# Patient Record
Sex: MALE | Race: ASIAN | NOT HISPANIC OR LATINO
[De-identification: names, ages, dates, MRNs, and addresses within clinical notes are randomized per-mention and may not be internally consistent; named-entity substitution may affect disease eponyms.]

---

## 2023-07-25 ENCOUNTER — TRANSCRIPTION ENCOUNTER (OUTPATIENT)
Age: 58
End: 2023-07-25

## 2023-07-25 VITALS
WEIGHT: 199.96 LBS | DIASTOLIC BLOOD PRESSURE: 87 MMHG | TEMPERATURE: 98 F | HEART RATE: 57 BPM | RESPIRATION RATE: 18 BRPM | HEIGHT: 62 IN | OXYGEN SATURATION: 96 % | SYSTOLIC BLOOD PRESSURE: 135 MMHG

## 2023-07-25 LAB
ALBUMIN SERPL ELPH-MCNC: 4.1 G/DL — SIGNIFICANT CHANGE UP (ref 3.3–5)
ALP SERPL-CCNC: 56 U/L — SIGNIFICANT CHANGE UP (ref 40–120)
ALT FLD-CCNC: SIGNIFICANT CHANGE UP (ref 10–45)
ANION GAP SERPL CALC-SCNC: 14 MMOL/L — SIGNIFICANT CHANGE UP (ref 5–17)
AST SERPL-CCNC: SIGNIFICANT CHANGE UP (ref 10–40)
BASOPHILS # BLD AUTO: 0.04 K/UL — SIGNIFICANT CHANGE UP (ref 0–0.2)
BASOPHILS NFR BLD AUTO: 0.3 % — SIGNIFICANT CHANGE UP (ref 0–2)
BILIRUB SERPL-MCNC: 0.3 MG/DL — SIGNIFICANT CHANGE UP (ref 0.2–1.2)
BUN SERPL-MCNC: 15 MG/DL — SIGNIFICANT CHANGE UP (ref 7–23)
CALCIUM SERPL-MCNC: 9.7 MG/DL — SIGNIFICANT CHANGE UP (ref 8.4–10.5)
CHLORIDE SERPL-SCNC: 97 MMOL/L — SIGNIFICANT CHANGE UP (ref 96–108)
CO2 SERPL-SCNC: 24 MMOL/L — SIGNIFICANT CHANGE UP (ref 22–31)
CREAT SERPL-MCNC: 0.85 MG/DL — SIGNIFICANT CHANGE UP (ref 0.5–1.3)
EGFR: 101 ML/MIN/1.73M2 — SIGNIFICANT CHANGE UP
EOSINOPHIL # BLD AUTO: 0.01 K/UL — SIGNIFICANT CHANGE UP (ref 0–0.5)
EOSINOPHIL NFR BLD AUTO: 0.1 % — SIGNIFICANT CHANGE UP (ref 0–6)
GLUCOSE SERPL-MCNC: 370 MG/DL — HIGH (ref 70–99)
HCT VFR BLD CALC: 47.4 % — SIGNIFICANT CHANGE UP (ref 39–50)
HGB BLD-MCNC: 15.9 G/DL — SIGNIFICANT CHANGE UP (ref 13–17)
IMM GRANULOCYTES NFR BLD AUTO: 0.4 % — SIGNIFICANT CHANGE UP (ref 0–0.9)
LIDOCAIN IGE QN: 23 U/L — SIGNIFICANT CHANGE UP (ref 7–60)
LYMPHOCYTES # BLD AUTO: 14.5 % — SIGNIFICANT CHANGE UP (ref 13–44)
LYMPHOCYTES # BLD AUTO: 2 K/UL — SIGNIFICANT CHANGE UP (ref 1–3.3)
MAGNESIUM SERPL-MCNC: 1.8 MG/DL — SIGNIFICANT CHANGE UP (ref 1.6–2.6)
MCHC RBC-ENTMCNC: 28.7 PG — SIGNIFICANT CHANGE UP (ref 27–34)
MCHC RBC-ENTMCNC: 33.5 GM/DL — SIGNIFICANT CHANGE UP (ref 32–36)
MCV RBC AUTO: 85.6 FL — SIGNIFICANT CHANGE UP (ref 80–100)
MONOCYTES # BLD AUTO: 0.24 K/UL — SIGNIFICANT CHANGE UP (ref 0–0.9)
MONOCYTES NFR BLD AUTO: 1.7 % — LOW (ref 2–14)
NEUTROPHILS # BLD AUTO: 11.45 K/UL — HIGH (ref 1.8–7.4)
NEUTROPHILS NFR BLD AUTO: 83 % — HIGH (ref 43–77)
NRBC # BLD: 0 /100 WBCS — SIGNIFICANT CHANGE UP (ref 0–0)
PLATELET # BLD AUTO: 273 K/UL — SIGNIFICANT CHANGE UP (ref 150–400)
POTASSIUM SERPL-MCNC: SIGNIFICANT CHANGE UP (ref 3.5–5.3)
POTASSIUM SERPL-SCNC: SIGNIFICANT CHANGE UP (ref 3.5–5.3)
PROT SERPL-MCNC: 6.8 G/DL — SIGNIFICANT CHANGE UP (ref 6–8.3)
RBC # BLD: 5.54 M/UL — SIGNIFICANT CHANGE UP (ref 4.2–5.8)
RBC # FLD: 12.2 % — SIGNIFICANT CHANGE UP (ref 10.3–14.5)
SODIUM SERPL-SCNC: 135 MMOL/L — SIGNIFICANT CHANGE UP (ref 135–145)
WBC # BLD: 13.8 K/UL — HIGH (ref 3.8–10.5)
WBC # FLD AUTO: 13.8 K/UL — HIGH (ref 3.8–10.5)

## 2023-07-25 PROCEDURE — 71045 X-RAY EXAM CHEST 1 VIEW: CPT | Mod: 26

## 2023-07-25 PROCEDURE — 99285 EMERGENCY DEPT VISIT HI MDM: CPT

## 2023-07-25 PROCEDURE — 99282 EMERGENCY DEPT VISIT SF MDM: CPT

## 2023-07-25 PROCEDURE — 76705 ECHO EXAM OF ABDOMEN: CPT | Mod: 26

## 2023-07-25 NOTE — ED PROVIDER NOTE - PROGRESS NOTE DETAILS
karlo - wbc count 13.8, labs otherwise ok. US negative for biliary pathology.   CT prelim showing possible appy. surgery consulted.   signed out to day team pending final CT read and surg recs. pain controlled, abd exam benign at time of sign out.

## 2023-07-25 NOTE — ED PROVIDER NOTE - PHYSICAL EXAMINATION
Constitutional : Well appearing, non-toxic, no acute distress. awake, alert, oriented to person, place, time/situation.  Head : head normocephalic, atraumatic  EENMT : eyes clear bilaterally, PERRL, EOMI. airway patent. moist mucous membranes. neck supple.  Cardiac : Normal rate, regular rhythm. No murmur appreciated, no LE edema.  Resp : Breath sounds clear and equal bilaterally. Respirations even and unlabored.   Gastro : abdomen soft, nondistended. +RUQ and epigastric tenderness.  no rebound or guarding. no CVAT.  MSK :  range of motion is not limited, no muscle or joint tenderness  Vasc : Extremities warm and well perfused. 2+ radial and DP pulses. cap refill <2 seconds  Neuro : Alert and oriented, CNII-XII grossly intact, no focal deficits, no motor or sensory deficits.  Skin : Skin normal color for race, warm, dry and intact. No evidence of rash.  Psych : Alert and oriented to person, place, time/situation. normal mood and affect. no apparent risk to self or others.

## 2023-07-25 NOTE — ED ADULT NURSE NOTE - OBJECTIVE STATEMENT
58y male presents to ED c/o epigastric pain x4 hours that started while eating sweet plums, accompanied by n/v/d. Pt denies blood in stools, blood in vomit. Hx of DM2. A&Ox4.

## 2023-07-25 NOTE — ED PROVIDER NOTE - OBJECTIVE STATEMENT
58 yr old male, history of dm2, presents to the Emergency Department w abdominal pain  6 hours abd pain, right upper abdomen into epigastric region.   started after eating this afternoon   peptobismol w/o relief. constant pain. 9/10 pain.  +nausea / dry heaivng. no vomiting. They faxed a form 58 yr old male, history of dm2, presents to the Emergency Department w abdominal pain. pt w 6 hours of pain. right upper abdomen and epigastric region. started after eating this afternoon. sharp / stabbing pain. tried peptobismol w/o relief. constant pain. 9/10 pain.  +nausea / dry heaving. no vomiting. no fever, appetite changes, diarrhea, dark / bloody stools. no cp / sob. no hx similar pains.

## 2023-07-25 NOTE — ED PROVIDER NOTE - CLINICAL SUMMARY MEDICAL DECISION MAKING FREE TEXT BOX
r/o biliary pathology - cholelithiasis / cholecystitis. ddx also includes gerd, gastritis, pud, pancreatitis.   do not suspect but r/o atypical presentation of acs  plan - labs, ecg, ruq us  analgesia / antiemetics prn  reassess pt w hx of dm here w 6 hours of RUQ / epigastric pain. +nausea w/o vomiting. no fever, diarrhea.  pt well appearing, stable vitals, exam w RUQ and epigastric tenderness but benign abdomen.   r/o biliary pathology - cholelithiasis / cholecystitis.   ddx also includes gerd, gastritis, pud, pancreatitis.   do not suspect but r/o atypical presentation of acs  plan - labs, ecg, ruq us  analgesia / antiemetics prn  reassess

## 2023-07-25 NOTE — ED ADULT TRIAGE NOTE - CHIEF COMPLAINT QUOTE
Pt to ED c/o upper abd pain x1 days. Associated with dry heaving, and diarrhea. Denies blood in stool.

## 2023-07-25 NOTE — ED PROVIDER NOTE - NSFOLLOWUPINSTRUCTIONS_ED_ALL_ED_FT
Avoid greasy and spicy foods, caffeine and alcohol.   Take the medications described below as directed.   Follow up with your primary care doctor within 1 week for continued evaluation.   You should also follow up with a GASTROENTEROLOGIST if your symptoms improve. See office information listed here.     Return to this Emergency Department if your symptoms are persistent, worsening or new symptoms arise such as chest pain, shortness of breath, uncontrollable nausea/vomiting, vomiting blood, severe abdominal pain, blood in stool or black tarry stools, or any other concerns.      ALL OF THE FOLLOWING MEDICATIONS DO NOT REQUIRE A PRESCRIPTION AND CAN BE PURCHASED IN ANY PHARMACY:    1) FAMOTIDINE 20mg one tab 2 times a day (morning and night) for 1 month    2) MYLANTA as needed as directed on bottle - another GI medication to coat the stomach and treat the symptoms, does not decrease the amount of acid in the stomach but rather just coats the stomach and will help with immediate symptom relief.

## 2023-07-25 NOTE — ED PROVIDER NOTE - ATTENDING APP SHARED VISIT CONTRIBUTION OF CARE
58M dm c/o <24h postprandial ruq/epigastric abd pain. avss. no acute surgical abd. leukocytosis 13s. hyperglycemia w/o dka/hhs. ruq us w/o acute abnl. cxr w/o acute focal consolidation vs ptx vs pulm edema. persistent pain. s/o'd overnight team stable pending ct a/p -- anticipate admission.    I discussed the care of the pt directly with the ACP while the pt was in the ED. i have reviewed the ACP note and agree w/ the history, exam and plan of care other than as noted above.

## 2023-07-26 ENCOUNTER — TRANSCRIPTION ENCOUNTER (OUTPATIENT)
Age: 58
End: 2023-07-26

## 2023-07-26 ENCOUNTER — INPATIENT (INPATIENT)
Facility: HOSPITAL | Age: 58
LOS: 1 days | Discharge: HOME | DRG: 342 | End: 2023-07-28
Attending: STUDENT IN AN ORGANIZED HEALTH CARE EDUCATION/TRAINING PROGRAM | Admitting: STUDENT IN AN ORGANIZED HEALTH CARE EDUCATION/TRAINING PROGRAM
Payer: COMMERCIAL

## 2023-07-26 ENCOUNTER — RESULT REVIEW (OUTPATIENT)
Age: 58
End: 2023-07-26

## 2023-07-26 DIAGNOSIS — R10.9 UNSPECIFIED ABDOMINAL PAIN: ICD-10-CM

## 2023-07-26 LAB
A1C WITH ESTIMATED AVERAGE GLUCOSE RESULT: 11.7 % — HIGH (ref 4–5.6)
ALBUMIN SERPL ELPH-MCNC: 3.8 G/DL — SIGNIFICANT CHANGE UP (ref 3.3–5)
ALBUMIN SERPL ELPH-MCNC: 4 G/DL — SIGNIFICANT CHANGE UP (ref 3.3–5)
ALP SERPL-CCNC: 51 U/L — SIGNIFICANT CHANGE UP (ref 40–120)
ALP SERPL-CCNC: 59 U/L — SIGNIFICANT CHANGE UP (ref 40–120)
ALT FLD-CCNC: 23 U/L — SIGNIFICANT CHANGE UP (ref 10–45)
ALT FLD-CCNC: 30 U/L — SIGNIFICANT CHANGE UP (ref 10–45)
ANION GAP SERPL CALC-SCNC: 12 MMOL/L — SIGNIFICANT CHANGE UP (ref 5–17)
ANION GAP SERPL CALC-SCNC: 17 MMOL/L — SIGNIFICANT CHANGE UP (ref 5–17)
AST SERPL-CCNC: 14 U/L — SIGNIFICANT CHANGE UP (ref 10–40)
AST SERPL-CCNC: 17 U/L — SIGNIFICANT CHANGE UP (ref 10–40)
BILIRUB SERPL-MCNC: 0.3 MG/DL — SIGNIFICANT CHANGE UP (ref 0.2–1.2)
BILIRUB SERPL-MCNC: 0.4 MG/DL — SIGNIFICANT CHANGE UP (ref 0.2–1.2)
BUN SERPL-MCNC: 15 MG/DL — SIGNIFICANT CHANGE UP (ref 7–23)
BUN SERPL-MCNC: 16 MG/DL — SIGNIFICANT CHANGE UP (ref 7–23)
CALCIUM SERPL-MCNC: 8.7 MG/DL — SIGNIFICANT CHANGE UP (ref 8.4–10.5)
CALCIUM SERPL-MCNC: 9.7 MG/DL — SIGNIFICANT CHANGE UP (ref 8.4–10.5)
CHLORIDE SERPL-SCNC: 101 MMOL/L — SIGNIFICANT CHANGE UP (ref 96–108)
CHLORIDE SERPL-SCNC: 98 MMOL/L — SIGNIFICANT CHANGE UP (ref 96–108)
CO2 SERPL-SCNC: 22 MMOL/L — SIGNIFICANT CHANGE UP (ref 22–31)
CO2 SERPL-SCNC: 26 MMOL/L — SIGNIFICANT CHANGE UP (ref 22–31)
CREAT SERPL-MCNC: 0.78 MG/DL — SIGNIFICANT CHANGE UP (ref 0.5–1.3)
CREAT SERPL-MCNC: 0.87 MG/DL — SIGNIFICANT CHANGE UP (ref 0.5–1.3)
EGFR: 101 ML/MIN/1.73M2 — SIGNIFICANT CHANGE UP
EGFR: 104 ML/MIN/1.73M2 — SIGNIFICANT CHANGE UP
ESTIMATED AVERAGE GLUCOSE: 289 MG/DL — HIGH (ref 68–114)
GLUCOSE BLDC GLUCOMTR-MCNC: 207 MG/DL — HIGH (ref 70–99)
GLUCOSE BLDC GLUCOMTR-MCNC: 221 MG/DL — HIGH (ref 70–99)
GLUCOSE BLDC GLUCOMTR-MCNC: 244 MG/DL — HIGH (ref 70–99)
GLUCOSE BLDC GLUCOMTR-MCNC: 263 MG/DL — HIGH (ref 70–99)
GLUCOSE BLDC GLUCOMTR-MCNC: 285 MG/DL — HIGH (ref 70–99)
GLUCOSE BLDC GLUCOMTR-MCNC: 285 MG/DL — HIGH (ref 70–99)
GLUCOSE BLDC GLUCOMTR-MCNC: 286 MG/DL — HIGH (ref 70–99)
GLUCOSE SERPL-MCNC: 254 MG/DL — HIGH (ref 70–99)
GLUCOSE SERPL-MCNC: 358 MG/DL — HIGH (ref 70–99)
HCT VFR BLD CALC: 45.5 % — SIGNIFICANT CHANGE UP (ref 39–50)
HGB BLD-MCNC: 15.1 G/DL — SIGNIFICANT CHANGE UP (ref 13–17)
MAGNESIUM SERPL-MCNC: 1.9 MG/DL — SIGNIFICANT CHANGE UP (ref 1.6–2.6)
MCHC RBC-ENTMCNC: 28.3 PG — SIGNIFICANT CHANGE UP (ref 27–34)
MCHC RBC-ENTMCNC: 33.2 GM/DL — SIGNIFICANT CHANGE UP (ref 32–36)
MCV RBC AUTO: 85.2 FL — SIGNIFICANT CHANGE UP (ref 80–100)
NRBC # BLD: 0 /100 WBCS — SIGNIFICANT CHANGE UP (ref 0–0)
PHOSPHATE SERPL-MCNC: 3.7 MG/DL — SIGNIFICANT CHANGE UP (ref 2.5–4.5)
PLATELET # BLD AUTO: 262 K/UL — SIGNIFICANT CHANGE UP (ref 150–400)
POTASSIUM SERPL-MCNC: 4.1 MMOL/L — SIGNIFICANT CHANGE UP (ref 3.5–5.3)
POTASSIUM SERPL-MCNC: 4.7 MMOL/L — SIGNIFICANT CHANGE UP (ref 3.5–5.3)
POTASSIUM SERPL-SCNC: 4.1 MMOL/L — SIGNIFICANT CHANGE UP (ref 3.5–5.3)
POTASSIUM SERPL-SCNC: 4.7 MMOL/L — SIGNIFICANT CHANGE UP (ref 3.5–5.3)
PROT SERPL-MCNC: 6.4 G/DL — SIGNIFICANT CHANGE UP (ref 6–8.3)
PROT SERPL-MCNC: 6.9 G/DL — SIGNIFICANT CHANGE UP (ref 6–8.3)
RBC # BLD: 5.34 M/UL — SIGNIFICANT CHANGE UP (ref 4.2–5.8)
RBC # FLD: 12.8 % — SIGNIFICANT CHANGE UP (ref 10.3–14.5)
SODIUM SERPL-SCNC: 136 MMOL/L — SIGNIFICANT CHANGE UP (ref 135–145)
SODIUM SERPL-SCNC: 140 MMOL/L — SIGNIFICANT CHANGE UP (ref 135–145)
TROPONIN T, HIGH SENSITIVITY RESULT: 25 NG/L — SIGNIFICANT CHANGE UP (ref 0–51)
WBC # BLD: 17.54 K/UL — HIGH (ref 3.8–10.5)
WBC # FLD AUTO: 17.54 K/UL — HIGH (ref 3.8–10.5)

## 2023-07-26 PROCEDURE — 88304 TISSUE EXAM BY PATHOLOGIST: CPT | Mod: 26

## 2023-07-26 PROCEDURE — 99223 1ST HOSP IP/OBS HIGH 75: CPT | Mod: 57

## 2023-07-26 PROCEDURE — 99221 1ST HOSP IP/OBS SF/LOW 40: CPT

## 2023-07-26 PROCEDURE — 44970 LAPAROSCOPY APPENDECTOMY: CPT

## 2023-07-26 PROCEDURE — 74177 CT ABD & PELVIS W/CONTRAST: CPT | Mod: 26,MA

## 2023-07-26 PROCEDURE — 99222 1ST HOSP IP/OBS MODERATE 55: CPT | Mod: GC

## 2023-07-26 DEVICE — STAPLER COVIDIEN TRI-STAPLE 60MM PURPLE RELOAD: Type: IMPLANTABLE DEVICE | Status: FUNCTIONAL

## 2023-07-26 RX ORDER — GLUCAGON INJECTION, SOLUTION 0.5 MG/.1ML
1 INJECTION, SOLUTION SUBCUTANEOUS ONCE
Refills: 0 | Status: DISCONTINUED | OUTPATIENT
Start: 2023-07-26 | End: 2023-07-28

## 2023-07-26 RX ORDER — METRONIDAZOLE 500 MG
500 TABLET ORAL ONCE
Refills: 0 | Status: COMPLETED | OUTPATIENT
Start: 2023-07-26 | End: 2023-07-26

## 2023-07-26 RX ORDER — OXYCODONE HYDROCHLORIDE 5 MG/1
5 TABLET ORAL EVERY 6 HOURS
Refills: 0 | Status: DISCONTINUED | OUTPATIENT
Start: 2023-07-26 | End: 2023-07-28

## 2023-07-26 RX ORDER — METRONIDAZOLE 500 MG
500 TABLET ORAL EVERY 8 HOURS
Refills: 0 | Status: DISCONTINUED | OUTPATIENT
Start: 2023-07-26 | End: 2023-07-26

## 2023-07-26 RX ORDER — IOHEXOL 300 MG/ML
30 INJECTION, SOLUTION INTRAVENOUS ONCE
Refills: 0 | Status: COMPLETED | OUTPATIENT
Start: 2023-07-26 | End: 2023-07-26

## 2023-07-26 RX ORDER — MORPHINE SULFATE 50 MG/1
4 CAPSULE, EXTENDED RELEASE ORAL ONCE
Refills: 0 | Status: DISCONTINUED | OUTPATIENT
Start: 2023-07-26 | End: 2023-07-26

## 2023-07-26 RX ORDER — LOSARTAN/HYDROCHLOROTHIAZIDE 100MG-25MG
1 TABLET ORAL
Refills: 0 | DISCHARGE

## 2023-07-26 RX ORDER — BUDESONIDE AND FORMOTEROL FUMARATE DIHYDRATE 160; 4.5 UG/1; UG/1
2 AEROSOL RESPIRATORY (INHALATION)
Refills: 0 | Status: DISCONTINUED | OUTPATIENT
Start: 2023-07-26 | End: 2023-07-28

## 2023-07-26 RX ORDER — HEPARIN SODIUM 5000 [USP'U]/ML
7500 INJECTION INTRAVENOUS; SUBCUTANEOUS EVERY 8 HOURS
Refills: 0 | Status: DISCONTINUED | OUTPATIENT
Start: 2023-07-27 | End: 2023-07-28

## 2023-07-26 RX ORDER — ONDANSETRON 8 MG/1
4 TABLET, FILM COATED ORAL ONCE
Refills: 0 | Status: COMPLETED | OUTPATIENT
Start: 2023-07-26 | End: 2023-07-26

## 2023-07-26 RX ORDER — OXYCODONE HYDROCHLORIDE 5 MG/1
10 TABLET ORAL EVERY 6 HOURS
Refills: 0 | Status: DISCONTINUED | OUTPATIENT
Start: 2023-07-26 | End: 2023-07-28

## 2023-07-26 RX ORDER — SODIUM CHLORIDE 9 MG/ML
1000 INJECTION, SOLUTION INTRAVENOUS
Refills: 0 | Status: DISCONTINUED | OUTPATIENT
Start: 2023-07-26 | End: 2023-07-28

## 2023-07-26 RX ORDER — ACETAMINOPHEN 500 MG
1000 TABLET ORAL ONCE
Refills: 0 | Status: COMPLETED | OUTPATIENT
Start: 2023-07-26 | End: 2023-07-26

## 2023-07-26 RX ORDER — SODIUM CHLORIDE 9 MG/ML
1000 INJECTION INTRAMUSCULAR; INTRAVENOUS; SUBCUTANEOUS ONCE
Refills: 0 | Status: COMPLETED | OUTPATIENT
Start: 2023-07-26 | End: 2023-07-26

## 2023-07-26 RX ORDER — ATORVASTATIN CALCIUM 80 MG/1
1 TABLET, FILM COATED ORAL
Refills: 0 | DISCHARGE

## 2023-07-26 RX ORDER — TIOTROPIUM BROMIDE 18 UG/1
2 CAPSULE ORAL; RESPIRATORY (INHALATION) DAILY
Refills: 0 | Status: DISCONTINUED | OUTPATIENT
Start: 2023-07-26 | End: 2023-07-28

## 2023-07-26 RX ORDER — FLUTICASONE FUROATE, UMECLIDINIUM BROMIDE AND VILANTEROL TRIFENATATE 200; 62.5; 25 UG/1; UG/1; UG/1
1 POWDER RESPIRATORY (INHALATION)
Refills: 0 | DISCHARGE

## 2023-07-26 RX ORDER — CEFTRIAXONE 500 MG/1
INJECTION, POWDER, FOR SOLUTION INTRAMUSCULAR; INTRAVENOUS
Refills: 0 | Status: COMPLETED | OUTPATIENT
Start: 2023-07-26 | End: 2023-08-02

## 2023-07-26 RX ORDER — MONTELUKAST 4 MG/1
1 TABLET, CHEWABLE ORAL
Refills: 0 | DISCHARGE

## 2023-07-26 RX ORDER — DEXTROSE 50 % IN WATER 50 %
12.5 SYRINGE (ML) INTRAVENOUS ONCE
Refills: 0 | Status: DISCONTINUED | OUTPATIENT
Start: 2023-07-26 | End: 2023-07-28

## 2023-07-26 RX ORDER — METFORMIN HYDROCHLORIDE 850 MG/1
1 TABLET ORAL
Refills: 0 | DISCHARGE

## 2023-07-26 RX ORDER — INSULIN LISPRO 100/ML
VIAL (ML) SUBCUTANEOUS
Refills: 0 | Status: DISCONTINUED | OUTPATIENT
Start: 2023-07-26 | End: 2023-07-26

## 2023-07-26 RX ORDER — CEFTRIAXONE 500 MG/1
1000 INJECTION, POWDER, FOR SOLUTION INTRAMUSCULAR; INTRAVENOUS ONCE
Refills: 0 | Status: COMPLETED | OUTPATIENT
Start: 2023-07-26 | End: 2023-07-26

## 2023-07-26 RX ORDER — ATORVASTATIN CALCIUM 80 MG/1
20 TABLET, FILM COATED ORAL AT BEDTIME
Refills: 0 | Status: DISCONTINUED | OUTPATIENT
Start: 2023-07-26 | End: 2023-07-28

## 2023-07-26 RX ORDER — HYDROMORPHONE HYDROCHLORIDE 2 MG/ML
0.5 INJECTION INTRAMUSCULAR; INTRAVENOUS; SUBCUTANEOUS
Refills: 0 | Status: DISCONTINUED | OUTPATIENT
Start: 2023-07-26 | End: 2023-07-28

## 2023-07-26 RX ORDER — DEXTROSE 50 % IN WATER 50 %
15 SYRINGE (ML) INTRAVENOUS ONCE
Refills: 0 | Status: DISCONTINUED | OUTPATIENT
Start: 2023-07-26 | End: 2023-07-28

## 2023-07-26 RX ORDER — MONTELUKAST 4 MG/1
10 TABLET, CHEWABLE ORAL DAILY
Refills: 0 | Status: DISCONTINUED | OUTPATIENT
Start: 2023-07-26 | End: 2023-07-28

## 2023-07-26 RX ORDER — DEXTROSE 50 % IN WATER 50 %
25 SYRINGE (ML) INTRAVENOUS ONCE
Refills: 0 | Status: DISCONTINUED | OUTPATIENT
Start: 2023-07-26 | End: 2023-07-28

## 2023-07-26 RX ORDER — INSULIN LISPRO 100/ML
6 VIAL (ML) SUBCUTANEOUS ONCE
Refills: 0 | Status: COMPLETED | OUTPATIENT
Start: 2023-07-26 | End: 2023-07-26

## 2023-07-26 RX ORDER — HEPARIN SODIUM 5000 [USP'U]/ML
5000 INJECTION INTRAVENOUS; SUBCUTANEOUS EVERY 8 HOURS
Refills: 0 | Status: DISCONTINUED | OUTPATIENT
Start: 2023-07-26 | End: 2023-07-26

## 2023-07-26 RX ORDER — METRONIDAZOLE 500 MG
TABLET ORAL
Refills: 0 | Status: DISCONTINUED | OUTPATIENT
Start: 2023-07-26 | End: 2023-07-26

## 2023-07-26 RX ORDER — INSULIN LISPRO 100/ML
VIAL (ML) SUBCUTANEOUS
Refills: 0 | Status: DISCONTINUED | OUTPATIENT
Start: 2023-07-26 | End: 2023-07-28

## 2023-07-26 RX ORDER — SODIUM CHLORIDE 9 MG/ML
1000 INJECTION, SOLUTION INTRAVENOUS
Refills: 0 | Status: DISCONTINUED | OUTPATIENT
Start: 2023-07-26 | End: 2023-07-27

## 2023-07-26 RX ORDER — INSULIN GLARGINE 100 [IU]/ML
16 INJECTION, SOLUTION SUBCUTANEOUS AT BEDTIME
Refills: 0 | Status: DISCONTINUED | OUTPATIENT
Start: 2023-07-26 | End: 2023-07-27

## 2023-07-26 RX ORDER — FAMOTIDINE 10 MG/ML
20 INJECTION INTRAVENOUS ONCE
Refills: 0 | Status: COMPLETED | OUTPATIENT
Start: 2023-07-26 | End: 2023-07-26

## 2023-07-26 RX ORDER — PIOGLITAZONE HYDROCHLORIDE 15 MG/1
1 TABLET ORAL
Refills: 0 | DISCHARGE

## 2023-07-26 RX ADMIN — MORPHINE SULFATE 4 MILLIGRAM(S): 50 CAPSULE, EXTENDED RELEASE ORAL at 02:58

## 2023-07-26 RX ADMIN — INSULIN GLARGINE 16 UNIT(S): 100 INJECTION, SOLUTION SUBCUTANEOUS at 22:06

## 2023-07-26 RX ADMIN — SODIUM CHLORIDE 120 MILLILITER(S): 9 INJECTION, SOLUTION INTRAVENOUS at 09:52

## 2023-07-26 RX ADMIN — Medication 400 MILLIGRAM(S): at 10:08

## 2023-07-26 RX ADMIN — Medication 400 MILLIGRAM(S): at 15:57

## 2023-07-26 RX ADMIN — Medication 400 MILLIGRAM(S): at 22:05

## 2023-07-26 RX ADMIN — SODIUM CHLORIDE 1000 MILLILITER(S): 9 INJECTION INTRAMUSCULAR; INTRAVENOUS; SUBCUTANEOUS at 06:36

## 2023-07-26 RX ADMIN — Medication 100 MILLIGRAM(S): at 09:52

## 2023-07-26 RX ADMIN — ONDANSETRON 4 MILLIGRAM(S): 8 TABLET, FILM COATED ORAL at 03:13

## 2023-07-26 RX ADMIN — Medication 100 MILLIGRAM(S): at 16:50

## 2023-07-26 RX ADMIN — Medication 6: at 20:57

## 2023-07-26 RX ADMIN — Medication 4: at 12:53

## 2023-07-26 RX ADMIN — CEFTRIAXONE 100 MILLIGRAM(S): 500 INJECTION, POWDER, FOR SOLUTION INTRAMUSCULAR; INTRAVENOUS at 09:18

## 2023-07-26 RX ADMIN — Medication 1000 MILLIGRAM(S): at 16:27

## 2023-07-26 RX ADMIN — BUDESONIDE AND FORMOTEROL FUMARATE DIHYDRATE 2 PUFF(S): 160; 4.5 AEROSOL RESPIRATORY (INHALATION) at 22:06

## 2023-07-26 RX ADMIN — IOHEXOL 30 MILLILITER(S): 300 INJECTION, SOLUTION INTRAVENOUS at 02:58

## 2023-07-26 RX ADMIN — HEPARIN SODIUM 5000 UNIT(S): 5000 INJECTION INTRAVENOUS; SUBCUTANEOUS at 15:55

## 2023-07-26 RX ADMIN — Medication 1000 MILLIGRAM(S): at 10:45

## 2023-07-26 RX ADMIN — SODIUM CHLORIDE 120 MILLILITER(S): 9 INJECTION, SOLUTION INTRAVENOUS at 22:05

## 2023-07-26 RX ADMIN — ATORVASTATIN CALCIUM 20 MILLIGRAM(S): 80 TABLET, FILM COATED ORAL at 22:05

## 2023-07-26 RX ADMIN — FAMOTIDINE 20 MILLIGRAM(S): 10 INJECTION INTRAVENOUS at 02:58

## 2023-07-26 RX ADMIN — MONTELUKAST 10 MILLIGRAM(S): 4 TABLET, CHEWABLE ORAL at 12:52

## 2023-07-26 RX ADMIN — Medication 6 UNIT(S): at 22:56

## 2023-07-26 NOTE — H&P ADULT - ATTENDING COMMENTS
Patient is a 58 year old gentleman with history of HTN, DM, Hyperlipidemia who presents with clinical, laboratory, radiographic findings concerning for acute appendicitis. Patient reporting right sided abdominal pain with nausea and emesis as well as diarrhea. At time of evaluation afebrile, hemodynamically stable, right abdomen tender to palpation, soft, mild distension, obese. CT imaging demonstrates a dilated and enlarged fluid filled appendix with inflammatory changes and periappendiceal stranding consistent with acute uncomplicated appendicitis. Labs significant for leukocytosis as well as hyperglycemia and A1C 11. Admission, bowel rest, IV fluids, IV antibiotics, OR for laparoscopic appendectomy.

## 2023-07-26 NOTE — CONSULT NOTE ADULT - SUBJECTIVE AND OBJECTIVE BOX
HPI:  58 year old male with pmhx of Dm2, htn, and hld w. no SHx presents to ED with 1 day history of new onset sharp abdominal pain. Patient states pain occurred after eating a plum yesterday afternoon, started near epigastrium and radiated to RUQ. Symptoms associated with +n/+v x3/ +f/ +liquidly bm x3. Pain aggravated with food, and resolved with pain medication. Denies previous episodes, recent travel, sick contacts, fever, weakness, light headiness sob, chest pain.    In the ED; wbc 13.8/ hgb 15.9/ hct 47.4. CMP  bilirubin .3/ ALP 59/ AST 17/ ALT 30. RUQ US no CBD dilation, only Hepatic steatosis and borderline hepatomegaly. CT A/p w. contrast IV/ PO   1. No abnormal radiologic findings to explain upper abdominal pain.  2. Dilated fluid-filled appendix with mild inflammatory change concerning for possible acute appendicitis. Correlate clinically i.e assess for right lower quadrant rebound tenderness..   (26 Jul 2023 08:24)    PMH: HTN, DM II, HLD, ? COPD  PSH: denies:  SHx: former smoker quit 7 years ago, occasional alcohol, works as a   Family history: father with MI age 58,  Allergies: denies      Patient is a 58y old  Male who presents with a chief complaint of Abdominal Pain (26 Jul 2023 08:24)    SUBJECTIVE:  Patient was seen and examined at bedside. Reports persistent RLQ abdominal pain, no N/V, last episode of vomiting at 3 am this morning, no diarrhea, he is unsure if passing flatus. Denies SOB, no chest pain, no orthopnea, reports quitting smoking 7 years ago. Denies SOB, no cough. He is unable to remember all his medications.     Review of systems: Rest of 12 point Review of systems negative unless otherwise documented elsewhere in note.     Diet, NPO:   Except Medications  With Ice Chips/Sips of Water (07-26-23 @ 10:38) [Active]      MEDICATIONS:  MEDICATIONS  (STANDING):  acetaminophen   IVPB .. 1000 milliGRAM(s) IV Intermittent once  acetaminophen   IVPB .. 1000 milliGRAM(s) IV Intermittent once  atorvastatin 20 milliGRAM(s) Oral at bedtime  budesonide  80 MICROgram(s)/formoterol 4.5 MICROgram(s) Inhaler 2 Puff(s) Inhalation two times a day  dextrose 5%. 1000 milliLiter(s) (50 mL/Hr) IV Continuous <Continuous>  dextrose 5%. 1000 milliLiter(s) (100 mL/Hr) IV Continuous <Continuous>  dextrose 50% Injectable 12.5 Gram(s) IV Push once  dextrose 50% Injectable 25 Gram(s) IV Push once  dextrose 50% Injectable 25 Gram(s) IV Push once  glucagon  Injectable 1 milliGRAM(s) IntraMuscular once  heparin   Injectable 5000 Unit(s) SubCutaneous every 8 hours  insulin lispro (ADMELOG) corrective regimen sliding scale   SubCutaneous three times a day before meals  lactated ringers. 1000 milliLiter(s) (120 mL/Hr) IV Continuous <Continuous>  metroNIDAZOLE  IVPB 500 milliGRAM(s) IV Intermittent every 8 hours  metroNIDAZOLE  IVPB      montelukast 10 milliGRAM(s) Oral daily  tiotropium 2.5 MICROgram(s) Inhaler 2 Puff(s) Inhalation daily    MEDICATIONS  (PRN):  dextrose Oral Gel 15 Gram(s) Oral once PRN Blood Glucose LESS THAN 70 milliGRAM(s)/deciliter      Allergies    No Known Allergies    Intolerances        OBJECTIVE:  Vital Signs Last 24 Hrs  T(C): 36.7 (26 Jul 2023 14:14), Max: 36.8 (26 Jul 2023 09:21)  T(F): 98 (26 Jul 2023 14:14), Max: 98.3 (26 Jul 2023 09:21)  HR: 70 (26 Jul 2023 14:14) (57 - 96)  BP: 157/96 (26 Jul 2023 14:14) (135/87 - 159/99)  BP(mean): --  RR: 18 (26 Jul 2023 14:14) (16 - 18)  SpO2: 96% (26 Jul 2023 14:14) (95% - 98%)    Parameters below as of 26 Jul 2023 14:14  Patient On (Oxygen Delivery Method): room air      I&O's Summary      PHYSICAL EXAM:  General: AOX3, NAD, lying in bed, speaking in full sentences, no labored breathing on RA  HEENT: AT/NC, no facial asymmetry  Lungs: good air entry, no crackles, no wheezes  Heart: RRR  Abdomen: obese, soft, RLQ tenderness, no rebound, + BS  Extremities:  warm, no edema, right knee scaly rash( chronic for last year), no tenderness, no focal deficit     LABS:                        15.1   17.54 )-----------( 262      ( 26 Jul 2023 12:55 )             45.5     07-26    136  |  98  |  16  ----------------------------<  254<H>  4.1   |  26  |  0.87    Ca    8.7      26 Jul 2023 12:55  Phos  3.7     07-26  Mg     1.9     07-26    TPro  6.4  /  Alb  3.8  /  TBili  0.4  /  DBili  x   /  AST  14  /  ALT  23  /  AlkPhos  51  07-26    LIVER FUNCTIONS - ( 26 Jul 2023 12:55 )  Alb: 3.8 g/dL / Pro: 6.4 g/dL / ALK PHOS: 51 U/L / ALT: 23 U/L / AST: 14 U/L / GGT: x             CAPILLARY BLOOD GLUCOSE      POCT Blood Glucose.: 244 mg/dL (26 Jul 2023 12:46)    Urinalysis Basic - ( 26 Jul 2023 12:55 )    Color: x / Appearance: x / SG: x / pH: x  Gluc: 254 mg/dL / Ketone: x  / Bili: x / Urobili: x   Blood: x / Protein: x / Nitrite: x   Leuk Esterase: x / RBC: x / WBC x   Sq Epi: x / Non Sq Epi: x / Bacteria: x        MICRODATA:      RADIOLOGY/OTHER STUDIES:  CT abdomen pelvis, CXR, RUQ US, EKG reviewed

## 2023-07-26 NOTE — CONSULT NOTE ADULT - ATTENDING COMMENTS
Pt seen on rounds this afternoon.  58-yo man with HTN, COPD and type 2 DM who presented after experiencing acute onset of periumbilical pain with vomiting while at work today.  The pain was non-radiating, and may have started to move somewhat to the right side after a few hours.  CT scan was suggestive of acute appendicitis and he is scheduled to go to the OR later today.  Has a 3-year history of type 2 DM, currently treated with a combination of metformin and glimepiride.  Glucose was 370 mg%, A1c 11.7% on presentation today.  He does not monitor fingersticks at home, and is unclear when he last saw his PCP.  Diet is detailed by Dr. Piedra above, and is obviously a major contributor to his hyperglycemia--sugar in his coffee, fruit drinks and regular soda, excessive portions of rice at his meal, cheese biscuits, etc as snacks.  Family history is strongly positive for DM.  Exam is most notable for marked central obesity with a distinctly protuberant abdomen.  There is periumbilical and RUQ/RLQ guarding.  --Will start on basal insulin with 16 units of Lantus tonight  --Moderate dose sliding scale coverage  --Cannot start on premeal standing insulin until diet resumed post-op  --Discussed diet with pt in detail, though he was only partially aware of some of the changes he needed to make--admits that cutting out the fruit and fruit drinks will be a problem  --Also discussed the risk of his fatty liver in terms of progression to cirrhosis, risk of HCC

## 2023-07-26 NOTE — H&P ADULT - ASSESSMENT
58 year old male with pmhx of Dm2, htn, and hld w. no SHx presents to ED with new onset sharp consistent abdominal pain starting at the epigastrium and radiating to the RUQ.. Patient states pain occured after eating a plum yesterday afternoon, Symptoms associated with +n/+v x3/ +f/ +liquidly bm x3. Pain aggrevated with food, and resolved with pain medication. In the ED labs significant for wbc 13.8/ hgb 15.9/ hct 47.4. Bilirubin .3/ ALP 59/ AST 17/ ALT 30. RUQ US no CBD dilation, only Hepatic steatosis and borderline hepatomegaly. CT A/p w. contrast IV/ PO Dilated fluid-filled appendix with mild inflammatory change concerning for possible acute appendicitis. Correlate clinically i.e assess for right lower quadrant rebound tenderness. Differentials include acute appendicitis vs. Viral gastroenteritis.     Plan;   Admit to regional for 23hr obs  NPO/ IVF  Pain/ Nausea control  Home meds  mISS/ SCD/ IS/ SQH       58 year old male with pmhx of Dm2, htn, and hld w. no SHx presents to ED with new onset sharp consistent abdominal pain starting at the epigastrium and radiating to the RUQ.. Patient states pain occured after eating a plum yesterday afternoon, Symptoms associated with +n/+v x3/ +f/ +liquidly bm x3. Pain aggrevated with food, and resolved with pain medication. In the ED labs significant for wbc 13.8/ hgb 15.9/ hct 47.4. Bilirubin .3/ ALP 59/ AST 17/ ALT 30. RUQ US no CBD dilation, only Hepatic steatosis and borderline hepatomegaly. CT A/p w. contrast IV/ PO Dilated fluid-filled appendix with mild inflammatory change concerning for possible acute appendicitis. Correlate clinically i.e assess for right lower quadrant rebound tenderness. Differentials include acute appendicitis vs. Viral gastroenteritis.     Plan;   Admit to regional for 23hr obs  NPO/ IVF  Pain/ Nausea control  Home meds  FABIANO  mISS/ SCD/ IS/ SQH       58 year old male with pmhx of Dm2, htn, and hld w. no SHx presents to ED with new onset sharp consistent abdominal pain starting at the epigastrium and radiating to the RUQ.. Patient states pain occured after eating a plum yesterday afternoon, Symptoms associated with +n/+v x3/ +f/ +liquidly bm x3. Pain aggrevated with food, and resolved with pain medication. In the ED labs significant for wbc 13.8/ hgb 15.9/ hct 47.4. Bilirubin .3/ ALP 59/ AST 17/ ALT 30. RUQ US no CBD dilation, only Hepatic steatosis and borderline hepatomegaly. CT A/p w. contrast IV/ PO Dilated fluid-filled appendix with mild inflammatory change concerning for possible acute appendicitis. Correlate clinically i.e assess for right lower quadrant rebound tenderness. Differentials include acute appendicitis vs. Viral gastroenteritis.     Plan;   Admit to regional for 23hr obs  NPO/ IVF  CTX / Flagyl  Pain/ Nausea control  Home meds  FABIANO  mISS/ SCD/ IS/ SQH

## 2023-07-26 NOTE — H&P ADULT - NSHPLABSRESULTS_GEN_ALL_CORE
ACC: [id number] EXAM:  CT ABDOMEN AND PELVIS OC IC       PROCEDURE DATE:  07/26/2023   INTERPRETATION:  CLINICAL INFORMATION: Upper abdominal pain.  COMPARISON: None.    CONTRAST/COMPLICATIONS:  IV Contrast: Isovue 370  95 cc administered   5 cc discarded  Oral Contrast: Omnipaque 300  Complications: None reported at time of study completion    PROCEDURE:  CT of the Abdomen and Pelvis was performed.  Sagittal and coronal reformats were performed.    FINDINGS:  LOWER CHEST: Mild cardiomegaly with dilated appearance of left ventricle.   Mosaic lung attenuation of the lower lobes.    LIVER: Approaches upper limit of normal in size, measuring 18.7 cm   craniocaudal dimension. A subcentimeter hypodensity in the right hepatic   lobe is too small to characterize.  BILE DUCTS: Normal caliber.  GALLBLADDER: Within normal limits.  SPLEEN: Within normal limits.  PANCREAS: Within normal limits.  ADRENALS: Benign fatty 2.1 cm nodular lesion of right adrenal   gland-consistent with myelolipoma..  KIDNEYS/URETERS: Within normal limits.    BLADDER: Within normal limits.  REPRODUCTIVE ORGANS: Normal size prostate gland with central coarse   calcifications noted, nonspecific.    BOWEL: Enteric contrast reaches small bowel loops in the lower abdomen.   No contrast filling of the colon. No bowel obstruction. Dilated   fluid-filled retrocecal appendix measuring up to 1.3 cm diameter. Likely   mild adjacent fat stranding.  PERITONEUM: No ascites.  VESSELS: Mild atherosclerotic changes.  RETROPERITONEUM/LYMPH NODES: No lymphadenopathy.  ABDOMINAL WALL: Small fat-containing umbilical and bilateral inguinal   hernias.  BONES: Lumbarization of S1. Degenerative disc disease L5/S1.   Enthesopathic changes bilateral anterior superior iliac spines.    IMPRESSION:  1. No abnormal radiologic findings to explain upper abdominal pain.    2. Dilated fluid-filled appendix with mild inflammatory change concerning   for possible acute appendicitis. Correlate clinically i.e assess for   right lower quadrant rebound tenderness..    ACC: [id number] EXAM:  US ABDOMEN RT UPR QUADRANT   ORDERED BY: DEL RUBIO     PROCEDURE DATE:  07/25/2023        FINDINGS:    Liver: Borderline enlarged, measuring 17.8 cm craniocaudal dimension.   Increased parenchymal echogenicity. No focal lesion identified.   Hepatopetal flow noted in main portal vein.  Bile ducts: Normal caliber. Common bile duct measures 4 mm.  Gallbladder: Within normal limits.  Pancreas: Visualized portions are within normal limits.  Right kidney: 11.5 cm. No hydronephrosis.  Ascites: None.  IVC: Visualized portions are within normal limits.    IMPRESSION:    1.No acute pathologic findings identified.    2. Hepatic steatosis and borderline hepatomegaly.

## 2023-07-26 NOTE — CONSULT NOTE ADULT - SUBJECTIVE AND OBJECTIVE BOX
HISTORY OF PRESENT ILLNESS  JOI THOMAS is a 58y Male with a past medical history of HTN, DM type 2, COPD presented with 1 day history of sudden onset abdominal pain while he was at work.  He describes the pain as sharp, localizes in the periumbilcal region, no radiation, accompanied by vomiting.  He denies hematemesis, hematochezia, melena.      On admission, Pt was afebrile and hemodynamically stable.  Labs reveal WBC 13.8, Cr 0.85, Na 135, Glucose 370, AST 14, ALT 23, ALP 51, lipase 23, a1c 11.7.    Abd ultrasound showed Hepatic steatosis and borderline hepatomegaly.  CT A/P showed Dilated fluid-filled appendix with mild inflammatory change concerning  for possible acute appendicitis.  Pt had received 1x ceftriaxone and 1xflagyl and the plan is to take patient to surgery.    Endocrinology has been consulted for Diabetes Management.    DIABETES HISTORY  - Age at diagnosis: 3 years ago  - Current Therapy: metformin 1g bid, glimipiride 4mg  - History of other regimens: none   - History of hypoglycemia: NA  - History of DKA/HHS:  NA  - Diabetic Complications:   - Home FSG: checked last time last year.  only checked FBG - usually around 120s  - Diet:          > Breakfast:  coffee with 2 spoons of brown sugar, potatoes, eggs        > Lunch: usually skips lunch        > Dinner: 3 scoops of rice, fried fish or fried meat, soup, strawberry and blueberry juice        > Snacks/drinks: Gingerale 1 can/day  - Diabetes managed outpatient by: PCP, pt states he does not remember what his last a1c was last blood work and saw the doctor 1 year ago    FAMILY HISTORY  - Diabetes: 3 family members, 1 brother  - Thyroid: denies  - Autoimmune: denies    SOCIAL HISTORY  - Work:   - Alcohol: socially  - Smoking: quit 7 years ago    ALLERGIES  No Known Allergies      CURRENT MEDICATIONS  acetaminophen   IVPB .. 1000 milliGRAM(s) IV Intermittent once  atorvastatin 20 milliGRAM(s) Oral at bedtime  budesonide  80 MICROgram(s)/formoterol 4.5 MICROgram(s) Inhaler 2 Puff(s) Inhalation two times a day  dextrose 5%. 1000 milliLiter(s) IV Continuous <Continuous>  dextrose 5%. 1000 milliLiter(s) IV Continuous <Continuous>  dextrose 50% Injectable 12.5 Gram(s) IV Push once  dextrose 50% Injectable 25 Gram(s) IV Push once  dextrose 50% Injectable 25 Gram(s) IV Push once  dextrose Oral Gel 15 Gram(s) Oral once PRN  glucagon  Injectable 1 milliGRAM(s) IntraMuscular once  heparin   Injectable 5000 Unit(s) SubCutaneous every 8 hours  insulin lispro (ADMELOG) corrective regimen sliding scale   SubCutaneous three times a day before meals  lactated ringers. 1000 milliLiter(s) IV Continuous <Continuous>  metroNIDAZOLE  IVPB 500 milliGRAM(s) IV Intermittent every 8 hours  metroNIDAZOLE  IVPB      montelukast 10 milliGRAM(s) Oral daily  tiotropium 2.5 MICROgram(s) Inhaler 2 Puff(s) Inhalation daily      REVIEW OF SYSTEMS  Constitutional:  Negative fever, chills or loss of appetite.  Eyes:  Negative blurry vision or double vision.  Cardiovascular:  Negative for chest pain or palpitations.  Respiratory:  Negative for cough, wheezing, or shortness of breath.   Gastrointestinal:  Negative for nausea, + vomiting, -  diarrhea, - constipation, + abdominal pain.  Genitourinary:  Negative frequency, urgency or dysuria.  Neurologic:  No headache, confusion, dizziness, lightheadedness.    PHYSICAL EXAM  Vital Signs Last 24 Hrs  T(C): 36.7 (26 Jul 2023 14:14), Max: 36.8 (26 Jul 2023 09:21)  T(F): 98 (26 Jul 2023 14:14), Max: 98.3 (26 Jul 2023 09:21)  HR: 70 (26 Jul 2023 14:14) (57 - 96)  BP: 157/96 (26 Jul 2023 14:14) (135/87 - 159/99)  BP(mean): --  RR: 18 (26 Jul 2023 14:14) (16 - 18)  SpO2: 96% (26 Jul 2023 14:14) (95% - 98%)    Parameters below as of 26 Jul 2023 14:14  Patient On (Oxygen Delivery Method): room air    Constitutional: Awake, alert, in no acute distress.   HEENT: Normocephalic, atraumatic, OSMAR, no proptosis or lid retraction.   Neck: supple, no acanthosis, no thyromegaly or palpable thyroid nodules.  Respiratory: Lungs clear to ausculation bilaterally.   Cardiovascular: regular rhythm, normal S1 and S2, no audible murmurs.   GI: normal bowel sounds, tenderness periumbilical region and RLQ  Extremities: No lower extremity edema, peripheral pulses present.   Skin: no rashes.   Psychiatric: AAO x 3. Normal affect/mood.     LABS  CBC - WBC/HGB/HTC/PLT: 17.54/15.1/45.5/262 (07-26-23)  BMP: Na/K/Cl/Bicarb/BUN/Cr/Gluc: 136/4.1/98/26/16/0.87/254 (07-26-23)  Anion Gap: 12 (07-26-23)  eGFR: 101 (07-26-23)  Calcium: 8.7 (07-26-23)  Phosphorus: 3.7 (07-26-23)  Magnesium: 1.9 (07-26-23)  LFT - Alb/Tprot/Tbili/Dbili/AlkPhos/ALT/AST: 3.8/--/0.4/--/51/23/14 (07-26-23)      CBC - WBC/HGB/HTC/PLT: 17.54/15.1/45.5/262 (07-26-23)BMP: Na/K/Cl/Bicarb/BUN/Cr/Gluc: 136/4.1/98/26/16/0.87/254 (07-26-23)  Anion Gap: 12 (07-26-23)  eGFR: 101 (07-26-23)  Calcium: 8.7 (07-26-23)  Phosphorus: 3.7 (07-26-23)  Magnesium: 1.9 (07-26-23)    CAPILLARY BLOOD GLUCOSE & INSULIN RECEIVED  244 mg/dL (07-26 @ 12:46) - 4 u lispro miss  207 mg/dL (07-26 @ 15:05)          ASSESSMENT / RECOMMENDATIONS    58y Male with a past medical history of HTN, DM type 2 (on metformin and glimipiride who have not seen a doctor for 1 year), COPD presented with 1 day history of sudden onset abdominal pain, found to have appendicitis, pending surgery later today.  Endocrinology has been consulted for Diabetes Managment.    A1C: 11.7 %  BUN: 16  Creatinine: 0.87  GFR: 101  Weight: 90.7  BMI: 36.6    # Type 2 diabetes mellitus with hyperglycemia  - Please start lantus *** units at bedtime.   - Please add lispro  units before each meal.  - Continue lispro medium dose sliding scale before meals and at bedtime.  - Patient's fingerstick glucose goal is 100-180 mg/dL.    - Discharge recommendations to be discussed.   - Patient can follow up at discharge with [facility] by calling [phone] to make an appointment.      Case discussed with Dr. Jennings. Primary team updated.       Amanda Piedra  Endocrinology Fellow    Service Pager: [phone]  HISTORY OF PRESENT ILLNESS  JOI THOMAS is a 58y Male with a past medical history of HTN, DM type 2, COPD presented with 1 day history of sudden onset abdominal pain while he was at work.  He describes the pain as sharp, localizes in the periumbilcal region, no radiation, accompanied by vomiting.  He denies hematemesis, hematochezia, melena.      On admission, Pt was afebrile and hemodynamically stable.  Labs reveal WBC 13.8, Cr 0.85, Na 135, Glucose 370, AST 14, ALT 23, ALP 51, lipase 23, a1c 11.7.    Abd ultrasound showed Hepatic steatosis and borderline hepatomegaly.  CT A/P showed Dilated fluid-filled appendix with mild inflammatory change concerning  for possible acute appendicitis.  Pt had received 1x ceftriaxone and 1xflagyl and the plan is to take patient to surgery.    Endocrinology has been consulted for Diabetes Management.    DIABETES HISTORY  - Age at diagnosis: 3 years ago  - Current Therapy: metformin 1g bid, glimipiride 4mg  - History of other regimens: none   - History of hypoglycemia: NA  - History of DKA/HHS:  NA  - Diabetic Complications: denies  - Home FSG: checked last time last year.  only checked FBG - usually around 120s  - Diet:          > Breakfast:  coffee with 2 spoons of brown sugar, potatoes, eggs        > Lunch: usually skips lunch        > Dinner: 3 scoops of rice, fried fish or fried meat, soup, strawberry and blueberry juice        > Snacks/drinks: Gingerale 1 can/day  - Diabetes managed outpatient by: PCP, pt states he does not remember what his last a1c was last blood work and saw the doctor  3-4 months ago.  he was not endorsed that his glucose values were high.    FAMILY HISTORY  - Diabetes: 3 family members, 1 brother  - Thyroid: denies  - Autoimmune: denies    SOCIAL HISTORY  - Work:   - Alcohol: socially  - Smoking: quit 7 years ago    ALLERGIES  No Known Allergies      CURRENT MEDICATIONS  acetaminophen   IVPB .. 1000 milliGRAM(s) IV Intermittent once  atorvastatin 20 milliGRAM(s) Oral at bedtime  budesonide  80 MICROgram(s)/formoterol 4.5 MICROgram(s) Inhaler 2 Puff(s) Inhalation two times a day  dextrose 5%. 1000 milliLiter(s) IV Continuous <Continuous>  dextrose 5%. 1000 milliLiter(s) IV Continuous <Continuous>  dextrose 50% Injectable 12.5 Gram(s) IV Push once  dextrose 50% Injectable 25 Gram(s) IV Push once  dextrose 50% Injectable 25 Gram(s) IV Push once  dextrose Oral Gel 15 Gram(s) Oral once PRN  glucagon  Injectable 1 milliGRAM(s) IntraMuscular once  heparin   Injectable 5000 Unit(s) SubCutaneous every 8 hours  insulin lispro (ADMELOG) corrective regimen sliding scale   SubCutaneous three times a day before meals  lactated ringers. 1000 milliLiter(s) IV Continuous <Continuous>  metroNIDAZOLE  IVPB 500 milliGRAM(s) IV Intermittent every 8 hours  metroNIDAZOLE  IVPB      montelukast 10 milliGRAM(s) Oral daily  tiotropium 2.5 MICROgram(s) Inhaler 2 Puff(s) Inhalation daily      REVIEW OF SYSTEMS  Constitutional:  Negative fever, chills or loss of appetite.  Eyes:  Negative blurry vision or double vision.  Cardiovascular:  Negative for chest pain or palpitations.  Respiratory:  Negative for cough, wheezing, or shortness of breath.   Gastrointestinal:  Negative for nausea, + vomiting, -  diarrhea, - constipation, + abdominal pain.  Genitourinary:  Negative frequency, urgency or dysuria.  Neurologic:  No headache, confusion, dizziness, lightheadedness.    PHYSICAL EXAM  Vital Signs Last 24 Hrs  T(C): 36.7 (26 Jul 2023 14:14), Max: 36.8 (26 Jul 2023 09:21)  T(F): 98 (26 Jul 2023 14:14), Max: 98.3 (26 Jul 2023 09:21)  HR: 70 (26 Jul 2023 14:14) (57 - 96)  BP: 157/96 (26 Jul 2023 14:14) (135/87 - 159/99)  BP(mean): --  RR: 18 (26 Jul 2023 14:14) (16 - 18)  SpO2: 96% (26 Jul 2023 14:14) (95% - 98%)    Parameters below as of 26 Jul 2023 14:14  Patient On (Oxygen Delivery Method): room air    Constitutional: Awake, alert, in no acute distress.   HEENT: Normocephalic, atraumatic, OSMAR, no proptosis or lid retraction.   Neck: supple, no acanthosis, no thyromegaly or palpable thyroid nodules.  Respiratory: Lungs clear to ausculation bilaterally.   Cardiovascular: regular rhythm, normal S1 and S2, no audible murmurs.   GI: normal bowel sounds, tenderness periumbilical region and RLQ  Extremities: No lower extremity edema, peripheral pulses present.   Skin: no rashes.   Psychiatric: AAO x 3. Normal affect/mood.     LABS  CBC - WBC/HGB/HTC/PLT: 17.54/15.1/45.5/262 (07-26-23)  BMP: Na/K/Cl/Bicarb/BUN/Cr/Gluc: 136/4.1/98/26/16/0.87/254 (07-26-23)  Anion Gap: 12 (07-26-23)  eGFR: 101 (07-26-23)  Calcium: 8.7 (07-26-23)  Phosphorus: 3.7 (07-26-23)  Magnesium: 1.9 (07-26-23)  LFT - Alb/Tprot/Tbili/Dbili/AlkPhos/ALT/AST: 3.8/--/0.4/--/51/23/14 (07-26-23)      CBC - WBC/HGB/HTC/PLT: 17.54/15.1/45.5/262 (07-26-23)BMP: Na/K/Cl/Bicarb/BUN/Cr/Gluc: 136/4.1/98/26/16/0.87/254 (07-26-23)  Anion Gap: 12 (07-26-23)  eGFR: 101 (07-26-23)  Calcium: 8.7 (07-26-23)  Phosphorus: 3.7 (07-26-23)  Magnesium: 1.9 (07-26-23)    CAPILLARY BLOOD GLUCOSE & INSULIN RECEIVED  244 mg/dL (07-26 @ 12:46) - 4 u lispro miss  207 mg/dL (07-26 @ 15:05)          ASSESSMENT / RECOMMENDATIONS    58y Male with a past medical history of HTN, DM type 2 (on metformin and glimipiride who have not seen a doctor for 1 year), COPD presented with 1 day history of sudden onset abdominal pain, found to have appendicitis, pending surgery later today.  Endocrinology has been consulted for Diabetes Managment.    A1C: 11.7 %  BUN: 16  Creatinine: 0.87  GFR: 101  Weight: 90.7  BMI: 36.6    # Type 2 diabetes mellitus with hyperglycemia  - Please start lantus   units at bedtime.   - Please add lispro  units before each meal.  - Continue lispro medium dose sliding scale before meals and at bedtime.  - Patient's fingerstick glucose goal is 100-180 mg/dL.    - Discharge recommendations to be discussed.   - Patient can follow up at discharge with [facility] by calling [phone] to make an appointment.      Case discussed with Dr. Jennings. Primary team updated.       Amanda Piedra  Endocrinology Fellow    Service Pager: [phone]  HISTORY OF PRESENT ILLNESS  JOI THOMAS is a 58y Male with a past medical history of HTN, DM type 2, COPD, glaucoma presented with 1 day history of sudden onset abdominal pain while he was at work.  He describes the pain as sharp, localizes in the periumbilcal region, no radiation, accompanied by vomiting.  He denies hematemesis, hematochezia, melena.      On admission, Pt was afebrile and hemodynamically stable.  Labs reveal WBC 13.8, Cr 0.85, Na 135, Glucose 370, AST 14, ALT 23, ALP 51, lipase 23, a1c 11.7.    Abd ultrasound showed Hepatic steatosis and borderline hepatomegaly.  CT A/P showed Dilated fluid-filled appendix with mild inflammatory change concerning  for possible acute appendicitis.  Pt had received 1x ceftriaxone and 1xflagyl and the plan is to take patient to surgery.    Endocrinology has been consulted for Diabetes Management.    DIABETES HISTORY  - Age at diagnosis: 3 years ago  - Current Therapy: metformin 1g bid, glimipiride 4mg  - History of other regimens: none   - History of hypoglycemia: NA  - History of DKA/HHS:  NA  - Diabetic Complications: denies  - Home FSG: checked last time last year.  only checked FBG - usually around 120s  - Diet:          > Breakfast:  coffee with 2 spoons of brown sugar, potatoes, eggs        > Lunch: usually skips lunch        > Dinner: 3 scoops of rice, fried fish or fried meat, soup, strawberry and blueberry juice        > Snacks/drinks: Gingerale 1 can/day, cheese biscuits at night  - Diabetes managed outpatient by: PCP, pt states he does not remember what his last a1c was last blood work and saw the doctor  3-4 months ago.  he was not endorsed that his glucose values were high.    FAMILY HISTORY  - Diabetes: 3 family members, 1 brother  - Thyroid: denies  - Autoimmune: denies    SOCIAL HISTORY  - Work:   - Alcohol: socially  - Smoking: quit 7 years ago    ALLERGIES  No Known Allergies      CURRENT MEDICATIONS  acetaminophen   IVPB .. 1000 milliGRAM(s) IV Intermittent once  atorvastatin 20 milliGRAM(s) Oral at bedtime  budesonide  80 MICROgram(s)/formoterol 4.5 MICROgram(s) Inhaler 2 Puff(s) Inhalation two times a day  dextrose 5%. 1000 milliLiter(s) IV Continuous <Continuous>  dextrose 5%. 1000 milliLiter(s) IV Continuous <Continuous>  dextrose 50% Injectable 12.5 Gram(s) IV Push once  dextrose 50% Injectable 25 Gram(s) IV Push once  dextrose 50% Injectable 25 Gram(s) IV Push once  dextrose Oral Gel 15 Gram(s) Oral once PRN  glucagon  Injectable 1 milliGRAM(s) IntraMuscular once  heparin   Injectable 5000 Unit(s) SubCutaneous every 8 hours  insulin lispro (ADMELOG) corrective regimen sliding scale   SubCutaneous three times a day before meals  lactated ringers. 1000 milliLiter(s) IV Continuous <Continuous>  metroNIDAZOLE  IVPB 500 milliGRAM(s) IV Intermittent every 8 hours  metroNIDAZOLE  IVPB      montelukast 10 milliGRAM(s) Oral daily  tiotropium 2.5 MICROgram(s) Inhaler 2 Puff(s) Inhalation daily      REVIEW OF SYSTEMS  Constitutional:  Negative fever, chills or loss of appetite.  Eyes:  Negative blurry vision or double vision.  Cardiovascular:  Negative for chest pain or palpitations.  Respiratory:  Negative for cough, wheezing, or shortness of breath.   Gastrointestinal:  Negative for nausea, + vomiting, -  diarrhea, - constipation, + abdominal pain.  Genitourinary:  Negative frequency, urgency or dysuria.  Neurologic:  No headache, confusion, dizziness, lightheadedness.    PHYSICAL EXAM  Vital Signs Last 24 Hrs  T(C): 36.7 (26 Jul 2023 14:14), Max: 36.8 (26 Jul 2023 09:21)  T(F): 98 (26 Jul 2023 14:14), Max: 98.3 (26 Jul 2023 09:21)  HR: 70 (26 Jul 2023 14:14) (57 - 96)  BP: 157/96 (26 Jul 2023 14:14) (135/87 - 159/99)  BP(mean): --  RR: 18 (26 Jul 2023 14:14) (16 - 18)  SpO2: 96% (26 Jul 2023 14:14) (95% - 98%)    Parameters below as of 26 Jul 2023 14:14  Patient On (Oxygen Delivery Method): room air    Constitutional: Awake, alert, in no acute distress.   HEENT: Normocephalic, atraumatic, OSMAR, no proptosis or lid retraction.   Neck: supple, no acanthosis, no thyromegaly or palpable thyroid nodules.  Respiratory: Lungs clear to ausculation bilaterally.   Cardiovascular: regular rhythm, normal S1 and S2, no audible murmurs.   GI: normal bowel sounds, tenderness periumbilical region and RLQ  Extremities: No lower extremity edema, peripheral pulses present.   Skin: no rashes.   Psychiatric: AAO x 3. Normal affect/mood.     LABS  CBC - WBC/HGB/HTC/PLT: 17.54/15.1/45.5/262 (07-26-23)  BMP: Na/K/Cl/Bicarb/BUN/Cr/Gluc: 136/4.1/98/26/16/0.87/254 (07-26-23)  Anion Gap: 12 (07-26-23)  eGFR: 101 (07-26-23)  Calcium: 8.7 (07-26-23)  Phosphorus: 3.7 (07-26-23)  Magnesium: 1.9 (07-26-23)  LFT - Alb/Tprot/Tbili/Dbili/AlkPhos/ALT/AST: 3.8/--/0.4/--/51/23/14 (07-26-23)      CBC - WBC/HGB/HTC/PLT: 17.54/15.1/45.5/262 (07-26-23)BMP: Na/K/Cl/Bicarb/BUN/Cr/Gluc: 136/4.1/98/26/16/0.87/254 (07-26-23)  Anion Gap: 12 (07-26-23)  eGFR: 101 (07-26-23)  Calcium: 8.7 (07-26-23)  Phosphorus: 3.7 (07-26-23)  Magnesium: 1.9 (07-26-23)    CAPILLARY BLOOD GLUCOSE & INSULIN RECEIVED  244 mg/dL (07-26 @ 12:46) - 4 u lispro miss  207 mg/dL (07-26 @ 15:05)          ASSESSMENT / RECOMMENDATIONS    58y Male with a past medical history of HTN, DM type 2 (on metformin and glimipiride who have not seen a doctor for 1 year), COPD presented with 1 day history of sudden onset abdominal pain, found to have appendicitis, pending surgery later today.  Endocrinology has been consulted for Diabetes Managment.    A1C: 11.7 %  BUN: 16  Creatinine: 0.87  GFR: 101  Weight: 90.7  BMI: 36.6    # Type 2 diabetes mellitus with hyperglycemia  - Please start lantus 16  units at bedtime.   - No pre-meals lispro units while patient is NPO  - Continue lispro medium dose sliding scale before meals and at bedtime.  - Patient's fingerstick glucose goal is 100-180 mg/dL.    - Discharge recommendations to be discussed.   - Patient can follow up at discharge with [facility] by calling [phone] to make an appointment.      Case discussed with Dr. Jennings. Primary team updated.       Amanda Piedra  Endocrinology Fellow    Service Pager: [phone]

## 2023-07-26 NOTE — PROGRESS NOTE ADULT - SUBJECTIVE AND OBJECTIVE BOX
Procedure: Laparoscopic appendectomy    Surgeon: Dr. Zamora    Subjective: Pt doing well in the post op period. States that his appetite has already returned. Denies n/v. Denies cp/sob. Denies f/bm. Denies f/c.    Vital Signs Last 24 Hrs  T(C): 36.3 (26 Jul 2023 21:05), Max: 36.9 (26 Jul 2023 16:47)  T(F): 97.3 (26 Jul 2023 21:05), Max: 98.4 (26 Jul 2023 16:47)  HR: 66 (26 Jul 2023 21:05) (60 - 96)  BP: 110/67 (26 Jul 2023 21:05) (96/55 - 159/99)  BP(mean): 85 (26 Jul 2023 21:05) (69 - 132)  RR: 21 (26 Jul 2023 21:05) (16 - 24)  SpO2: 94% (26 Jul 2023 21:05) (91% - 100%)    Parameters below as of 26 Jul 2023 21:05  Patient On (Oxygen Delivery Method): nasal cannula  O2 Flow (L/min): 3      Physical Exam:  General: NAD, resting comfortably in bed  Pulmonary: Nonlabored breathing, no respiratory distress  Cardiovascular: NSR  Abdominal: appropriately TTP, nondistended, incisions clean/dry/intact  Extremities: WWP, normal strength  Neuro: A/O x 3, CNs II-XII grossly intact, no focal deficits    Assessment:58y Male s/p Laparoscopic appendectomy    Plan:  CLD  Pain/nausea control  Home meds as appropriate  mISS  SCDs/SQH/OOBA  AM labs

## 2023-07-26 NOTE — H&P ADULT - TIME BILLING
evaluation and management of acute appendicitis and uncontrolled hyperglycemia, review of labs and imaging, discussion with consultants, discussion with patient regarding operative vs. non operative management, discussion of risks and benefits of appendectomy, documentation

## 2023-07-26 NOTE — H&P ADULT - HISTORY OF PRESENT ILLNESS
58 year old male with pmhx of Dm2, htn, and hld w. no SHx presents to ED with 1 day history of new onset sharp abdominal pain. Patient states pain occured after eating a plum yesterday afternoon, started near epigastrium and radiated to RUQ. Symptoms associated with +n/+v x3/ +f/ +liquidly bm x3. Pain aggrevated with food, and resolved with pain medication. Denies previous episodes, recent travel, sick contacts, fever, weakness, light headedness, sob, chest pain.    In the ED; wbc 13.8/ hgb 15.9/ hct 47.4. CMP  bilirubin .3/ ALP 59/ AST 17/ ALT 30. RUQ US no CBD dilation, only Hepatic steatosis and borderline hepatomegaly. CT A/p w. contrast IV/ PO   1. No abnormal radiologic findings to explain upper abdominal pain.  2. Dilated fluid-filled appendix with mild inflammatory change concerning for possible acute appendicitis. Correlate clinically i.e assess for right lower quadrant rebound tenderness..   58 year old male with pmhx of Dm2, htn, and hld w. no SHx presents to ED with 1 day history of new onset sharp abdominal pain. Patient states pain occured after eating a plum yesterday afternoon, started near epigastrium and radiated to RUQ. Symptoms associated with +n/+v x3/ +f/ +liquidly bm x3. Pain aggravated with food, and resolved with pain medication. Denies previous episodes, recent travel, sick contacts, fever, weakness, light headedness, sob, chest pain.    In the ED; wbc 13.8/ hgb 15.9/ hct 47.4. CMP  bilirubin .3/ ALP 59/ AST 17/ ALT 30. RUQ US no CBD dilation, only Hepatic steatosis and borderline hepatomegaly. CT A/p w. contrast IV/ PO   1. No abnormal radiologic findings to explain upper abdominal pain.  2. Dilated fluid-filled appendix with mild inflammatory change concerning for possible acute appendicitis. Correlate clinically i.e assess for right lower quadrant rebound tenderness..

## 2023-07-26 NOTE — H&P ADULT - NSHPSOCIALHISTORY_GEN_ALL_CORE
Social; Employed, domiciled with wife and 2 kids.  Tobacco; past smoking hx quit 7 years ago, 2 packs a day. Socially drink, last weekend sip of wine. denies illicit drug use.  Hospitalizations; no prev hospitalizations

## 2023-07-26 NOTE — H&P ADULT - NSHPPHYSICALEXAM_GEN_ALL_CORE
VITALS    General: Laying in bed, resting  Lungs: normal resp effort,   Heart: RRR, no murmurs  Abd: Obese, soft, NT/ND, No rebound or guarding. Negative McBurney sign, Rovsing sign, Anton's sign  Extr: 5/5 strength x 4, no clubbing/cyanosis/edema  Pulses: 2+ peripheral pulses  skin: + scaly rash Left knee, L hand

## 2023-07-26 NOTE — BRIEF OPERATIVE NOTE - OPERATION/FINDINGS
Pt placed in supine position. Prepped and draped in sterile fashion. Veress needle at palmers point followed by optiview entry at umbilicus for abdominal access. Addititional 5mm ports placed suprapubic and in LLQ. Umbilical port exchanged for 12mm port. Small amount of pus encountered in RLQ, suctioned. Multiple adhesions to small bowel and cecum taken sharply and with ligasure device. Appendix identified retrocecally and noted to be inflamed but nonperforated and nongangrenous. Mesoappendix dissected to appendiceal base with ligasure. Cecum and TI identified and preserved. Appendix taken with endo humera stapler 60 purple load x1. Appendix removed with endocatch. Staple line inspected with no evidence of bleeding. Fascia closed with 0 vicryl. Skin closed with monocryl.

## 2023-07-26 NOTE — CONSULT NOTE ADULT - ASSESSMENT
59 y/o M with PMH of HTN, DM II, HLD, COPD? presents with abdominal pain, admitted for possible appendicitis    Abdominal pain  Possible appendicitis  Continue management per surgery, currently on Ceftriaxone/Metronidazole. Further surgical management per primary team  IS, OOB  Pain management    HTN  Above target.   Would hold ACE, Thiazide, can start Amlodipine 10 mg if needed for target .    DM II  ISS, Monitor FS    COPD  Patient former smoker, denies any symptoms. Continue home regimen  Albuterol prn, IS  Monitor respiratory status    Liver steatosis   Avoid hepatotoxics, no reports of alcohol use  Follow-up as outpatient    DVT ppx: per primary team   59 y/o M with PMH of HTN, DM II, HLD, COPD? presents with abdominal pain, admitted for possible appendicitis    Abdominal pain  Possible appendicitis  Continue management per surgery, currently on Ceftriaxone/Metronidazole. Further surgical management per primary team  IS, OOB  Pain management    HTN  Above target.   Would hold ACE, Thiazide, can start Amlodipine 10 mg if needed for target .    DM II  ISS, Monitor FS    COPD  Patient former smoker, denies any symptoms. Continue home regimen  Albuterol prn, IS  Monitor respiratory status    Liver steatosis   Avoid hepatotoxics, no reports of alcohol use  Follow-up as outpatient    Obesity BMI 36  Affects all aspect of care   DVT ppx: per primary team

## 2023-07-26 NOTE — PRE-ANESTHESIA EVALUATION ADULT - NSANTHOSAYNRD_GEN_A_CORE
No. REMIGIO screening performed.  STOP BANG Legend: 0-2 = LOW Risk; 3-4 = INTERMEDIATE Risk; 5-8 = HIGH Risk
No. REMIGIO screening performed.  STOP BANG Legend: 0-2 = LOW Risk; 3-4 = INTERMEDIATE Risk; 5-8 = HIGH Risk

## 2023-07-27 ENCOUNTER — TRANSCRIPTION ENCOUNTER (OUTPATIENT)
Age: 58
End: 2023-07-27

## 2023-07-27 LAB
ANION GAP SERPL CALC-SCNC: 12 MMOL/L — SIGNIFICANT CHANGE UP (ref 5–17)
BUN SERPL-MCNC: 18 MG/DL — SIGNIFICANT CHANGE UP (ref 7–23)
CALCIUM SERPL-MCNC: 8 MG/DL — LOW (ref 8.4–10.5)
CHLORIDE SERPL-SCNC: 100 MMOL/L — SIGNIFICANT CHANGE UP (ref 96–108)
CO2 SERPL-SCNC: 23 MMOL/L — SIGNIFICANT CHANGE UP (ref 22–31)
CREAT SERPL-MCNC: 0.96 MG/DL — SIGNIFICANT CHANGE UP (ref 0.5–1.3)
EGFR: 92 ML/MIN/1.73M2 — SIGNIFICANT CHANGE UP
GLUCOSE BLDC GLUCOMTR-MCNC: 166 MG/DL — HIGH (ref 70–99)
GLUCOSE BLDC GLUCOMTR-MCNC: 222 MG/DL — HIGH (ref 70–99)
GLUCOSE BLDC GLUCOMTR-MCNC: 228 MG/DL — HIGH (ref 70–99)
GLUCOSE BLDC GLUCOMTR-MCNC: 254 MG/DL — HIGH (ref 70–99)
GLUCOSE BLDC GLUCOMTR-MCNC: 278 MG/DL — HIGH (ref 70–99)
GLUCOSE SERPL-MCNC: 199 MG/DL — HIGH (ref 70–99)
HCT VFR BLD CALC: 41.1 % — SIGNIFICANT CHANGE UP (ref 39–50)
HGB BLD-MCNC: 14 G/DL — SIGNIFICANT CHANGE UP (ref 13–17)
MAGNESIUM SERPL-MCNC: 1.9 MG/DL — SIGNIFICANT CHANGE UP (ref 1.6–2.6)
MCHC RBC-ENTMCNC: 28.9 PG — SIGNIFICANT CHANGE UP (ref 27–34)
MCHC RBC-ENTMCNC: 34.1 GM/DL — SIGNIFICANT CHANGE UP (ref 32–36)
MCV RBC AUTO: 84.9 FL — SIGNIFICANT CHANGE UP (ref 80–100)
NRBC # BLD: 0 /100 WBCS — SIGNIFICANT CHANGE UP (ref 0–0)
PHOSPHATE SERPL-MCNC: 4 MG/DL — SIGNIFICANT CHANGE UP (ref 2.5–4.5)
PLATELET # BLD AUTO: 242 K/UL — SIGNIFICANT CHANGE UP (ref 150–400)
POTASSIUM SERPL-MCNC: 4.2 MMOL/L — SIGNIFICANT CHANGE UP (ref 3.5–5.3)
POTASSIUM SERPL-SCNC: 4.2 MMOL/L — SIGNIFICANT CHANGE UP (ref 3.5–5.3)
RBC # BLD: 4.84 M/UL — SIGNIFICANT CHANGE UP (ref 4.2–5.8)
RBC # FLD: 13 % — SIGNIFICANT CHANGE UP (ref 10.3–14.5)
SODIUM SERPL-SCNC: 135 MMOL/L — SIGNIFICANT CHANGE UP (ref 135–145)
WBC # BLD: 12.18 K/UL — HIGH (ref 3.8–10.5)
WBC # FLD AUTO: 12.18 K/UL — HIGH (ref 3.8–10.5)

## 2023-07-27 PROCEDURE — 99232 SBSQ HOSP IP/OBS MODERATE 35: CPT

## 2023-07-27 PROCEDURE — 99232 SBSQ HOSP IP/OBS MODERATE 35: CPT | Mod: GC

## 2023-07-27 RX ORDER — INSULIN LISPRO 100/ML
8 VIAL (ML) SUBCUTANEOUS ONCE
Refills: 0 | Status: DISCONTINUED | OUTPATIENT
Start: 2023-07-27 | End: 2023-07-27

## 2023-07-27 RX ORDER — MAGNESIUM SULFATE 500 MG/ML
1 VIAL (ML) INJECTION ONCE
Refills: 0 | Status: COMPLETED | OUTPATIENT
Start: 2023-07-27 | End: 2023-07-27

## 2023-07-27 RX ORDER — GLIMEPIRIDE 1 MG
1 TABLET ORAL
Refills: 0 | DISCHARGE

## 2023-07-27 RX ORDER — INSULIN LISPRO 100/ML
8 VIAL (ML) SUBCUTANEOUS
Refills: 0 | Status: DISCONTINUED | OUTPATIENT
Start: 2023-07-28 | End: 2023-07-28

## 2023-07-27 RX ORDER — ISOPROPYL ALCOHOL, BENZOCAINE .7; .06 ML/ML; ML/ML
0 SWAB TOPICAL
Qty: 100 | Refills: 1
Start: 2023-07-27

## 2023-07-27 RX ORDER — GLIMEPIRIDE 1 MG
1 TABLET ORAL
Qty: 30 | Refills: 0
Start: 2023-07-27

## 2023-07-27 RX ORDER — INSULIN LISPRO 100/ML
4 VIAL (ML) SUBCUTANEOUS ONCE
Refills: 0 | Status: COMPLETED | OUTPATIENT
Start: 2023-07-27 | End: 2023-07-27

## 2023-07-27 RX ORDER — EMPAGLIFLOZIN 10 MG/1
1 TABLET, FILM COATED ORAL
Qty: 30 | Refills: 1
Start: 2023-07-27 | End: 2023-09-24

## 2023-07-27 RX ORDER — INSULIN GLARGINE 100 [IU]/ML
18 INJECTION, SOLUTION SUBCUTANEOUS AT BEDTIME
Refills: 0 | Status: DISCONTINUED | OUTPATIENT
Start: 2023-07-27 | End: 2023-07-28

## 2023-07-27 RX ADMIN — HEPARIN SODIUM 7500 UNIT(S): 5000 INJECTION INTRAVENOUS; SUBCUTANEOUS at 12:53

## 2023-07-27 RX ADMIN — Medication 100 GRAM(S): at 07:07

## 2023-07-27 RX ADMIN — MONTELUKAST 10 MILLIGRAM(S): 4 TABLET, CHEWABLE ORAL at 12:53

## 2023-07-27 RX ADMIN — HEPARIN SODIUM 7500 UNIT(S): 5000 INJECTION INTRAVENOUS; SUBCUTANEOUS at 19:12

## 2023-07-27 RX ADMIN — HEPARIN SODIUM 7500 UNIT(S): 5000 INJECTION INTRAVENOUS; SUBCUTANEOUS at 03:48

## 2023-07-27 RX ADMIN — INSULIN GLARGINE 18 UNIT(S): 100 INJECTION, SOLUTION SUBCUTANEOUS at 22:46

## 2023-07-27 RX ADMIN — Medication 6: at 16:20

## 2023-07-27 RX ADMIN — SODIUM CHLORIDE 75 MILLILITER(S): 9 INJECTION, SOLUTION INTRAVENOUS at 06:21

## 2023-07-27 RX ADMIN — Medication 4 UNIT(S): at 22:46

## 2023-07-27 RX ADMIN — BUDESONIDE AND FORMOTEROL FUMARATE DIHYDRATE 2 PUFF(S): 160; 4.5 AEROSOL RESPIRATORY (INHALATION) at 10:34

## 2023-07-27 RX ADMIN — Medication 4: at 12:52

## 2023-07-27 RX ADMIN — ATORVASTATIN CALCIUM 20 MILLIGRAM(S): 80 TABLET, FILM COATED ORAL at 21:00

## 2023-07-27 RX ADMIN — BUDESONIDE AND FORMOTEROL FUMARATE DIHYDRATE 2 PUFF(S): 160; 4.5 AEROSOL RESPIRATORY (INHALATION) at 20:59

## 2023-07-27 RX ADMIN — Medication 2: at 06:20

## 2023-07-27 NOTE — PROGRESS NOTE ADULT - SUBJECTIVE AND OBJECTIVE BOX
SUBJECTIVE / INTERVAL HPI: Patient was seen and examined this morning.     Overnight events:      CAPILLARY BLOOD GLUCOSE & INSULIN RECEIVED  244 mg/dL (07-26 @ 12:46)  207 mg/dL (07-26 @ 15:05)  221 mg/dL (07-26 @ 17:23)  285 mg/dL (07-26 @ 20:00)  285 mg/dL (07-26 @ 21:55)  286 mg/dL (07-26 @ 22:33)  263 mg/dL (07-26 @ 23:34)  166 mg/dL (07-27 @ 06:01)      REVIEW OF SYSTEMS  Constitutional:  Negative fever, chills or loss of appetite.  Eyes:  Negative blurry vision or double vision.  Cardiovascular:  Negative for chest pain or palpitations.  Respiratory:  Negative for cough, wheezing, or shortness of breath.    Gastrointestinal:  Negative for nausea, vomiting, diarrhea, constipation, or abdominal pain.  Genitourinary:  Negative frequency, urgency or dysuria.  Neurologic:  No headache, confusion, dizziness, lightheadedness.    PHYSICAL EXAM  Vital Signs Last 24 Hrs  T(C): 37.1 (27 Jul 2023 09:00), Max: 37.1 (26 Jul 2023 22:00)  T(F): 98.8 (27 Jul 2023 09:00), Max: 98.8 (27 Jul 2023 09:00)  HR: 98 (27 Jul 2023 09:00) (60 - 98)  BP: 141/94 (27 Jul 2023 09:00) (96/55 - 159/99)  BP(mean): 85 (26 Jul 2023 21:05) (69 - 132)  RR: 18 (27 Jul 2023 09:00) (17 - 24)  SpO2: 95% (27 Jul 2023 09:00) (91% - 100%)    Parameters below as of 27 Jul 2023 09:00  Patient On (Oxygen Delivery Method): room air        Constitutional: Awake, alert, in no acute distress.   HEENT: Normocephalic, atraumatic, OSMAR.  Respiratory: Lungs clear to ausculation bilaterally.   Cardiovascular: regular rhythm, normal S1 and S2, no audible murmurs.   GI: soft, non-tender, non-distended, bowel sounds present.  Extremities: No lower extremity edema.  Psychiatric: AAO x 3. Normal affect/mood.     LABS  CBC - WBC/HGB/HTC/PLT: 12.18/14.0/41.1/242 (07-27-23)  BMP - Na/K/Cl/Bicarb/BUN/Cr/Gluc/AG/eGFR: 135/4.2/100/23/18/0.96/199/12/92 (07-27-23)  Ca - 8.0 (07-27-23)  Phos - 4.0 (07-27-23)  Mg - 1.9 (07-27-23)  LFT - Alb/Tprot/Tbili/Dbili/AlkPhos/ALT/AST: 3.8/--/0.4/--/51/23/14 (07-26-23)          07-26-23 @ 07:01  -  07-27-23 @ 07:00  --------------------------------------------------------  IN: 1280 mL / OUT: 925 mL / NET: 355 mL    07-27-23 @ 07:01  -  07-27-23 @ 10:00  --------------------------------------------------------  IN: 420 mL / OUT: 0 mL / NET: 420 mL        MEDICATIONS  MEDICATIONS  (STANDING):  atorvastatin 20 milliGRAM(s) Oral at bedtime  budesonide  80 MICROgram(s)/formoterol 4.5 MICROgram(s) Inhaler 2 Puff(s) Inhalation two times a day  dextrose 5%. 1000 milliLiter(s) (50 mL/Hr) IV Continuous <Continuous>  dextrose 5%. 1000 milliLiter(s) (100 mL/Hr) IV Continuous <Continuous>  dextrose 50% Injectable 12.5 Gram(s) IV Push once  dextrose 50% Injectable 25 Gram(s) IV Push once  dextrose 50% Injectable 25 Gram(s) IV Push once  glucagon  Injectable 1 milliGRAM(s) IntraMuscular once  heparin   Injectable 7500 Unit(s) SubCutaneous every 8 hours  insulin glargine Injectable (LANTUS) 16 Unit(s) SubCutaneous at bedtime  insulin lispro (ADMELOG) corrective regimen sliding scale   SubCutaneous three times a day before meals  montelukast 10 milliGRAM(s) Oral daily  tiotropium 2.5 MICROgram(s) Inhaler 2 Puff(s) Inhalation daily    MEDICATIONS  (PRN):  dextrose Oral Gel 15 Gram(s) Oral once PRN Blood Glucose LESS THAN 70 milliGRAM(s)/deciliter  HYDROmorphone  Injectable 0.5 milliGRAM(s) IV Push every 15 minutes PRN Severe Pain (7 - 10)  oxyCODONE    IR 5 milliGRAM(s) Oral every 6 hours PRN Moderate Pain (4 - 6)  oxyCODONE    IR 10 milliGRAM(s) Oral every 6 hours PRN Severe Pain (7 - 10)    ASSESSMENT / RECOMMENDATIONS    A1C: 11.7 %  BUN: 18  Creatinine: 0.96  GFR: 92  Weight: 90.7  BMI: 36.6    # Type 2 diabetes mellitus with hyperglycemia  - Please continue lantus *** units at bedtime.   - Continue lispro *** units before each meal.  - Continue lispro moderate / low dose sliding scale before meals and at bedtime.  - Patient's fingerstick glucose goal is 100-180 mg/dL.    - Discharge recommendations to be discussed.   - [facility] by calling [phone] to make an appointment.      Case discussed with Dr. Jennings. Primary team updated.       Amanda Piedra  Endocrinology Fellow    Service Pager: [phone]    SUBJECTIVE / INTERVAL HPI: Patient was seen and examined this morning.     Overnight events:  Pt is POD-1 s/p appendenctomy, patient tolerated the procedure  reports feeling fine  afebrile and hemodynamically stable  pt had turkey mann, fruits, eggs, tea and jelly for breakfast this morning    CAPILLARY BLOOD GLUCOSE & INSULIN RECEIVED  285 mg/dL (07-26 @ 21:55) - lispro 6 u   286 mg/dL (07-26 @ 22:33) - lantus 16 u + lispro 6 u  263 mg/dL (07-26 @ 23:34) - N/A  166 mg/dL (07-27 @ 06:01) - lispro 2 u      REVIEW OF SYSTEMS  Constitutional:  Negative fever, chills or loss of appetite.  Eyes:  Negative blurry vision or double vision.  Cardiovascular:  Negative for chest pain or palpitations.  Respiratory:  Negative for cough, wheezing, or shortness of breath.    Gastrointestinal:  Negative for nausea, vomiting, diarrhea, constipation, or abdominal pain.  Genitourinary:  Negative frequency, urgency or dysuria.  Neurologic:  No headache, confusion, dizziness, lightheadedness.    PHYSICAL EXAM  Vital Signs Last 24 Hrs  T(C): 37.1 (27 Jul 2023 09:00), Max: 37.1 (26 Jul 2023 22:00)  T(F): 98.8 (27 Jul 2023 09:00), Max: 98.8 (27 Jul 2023 09:00)  HR: 98 (27 Jul 2023 09:00) (60 - 98)  BP: 141/94 (27 Jul 2023 09:00) (96/55 - 159/99)  BP(mean): 85 (26 Jul 2023 21:05) (69 - 132)  RR: 18 (27 Jul 2023 09:00) (17 - 24)  SpO2: 95% (27 Jul 2023 09:00) (91% - 100%)    Parameters below as of 27 Jul 2023 09:00  Patient On (Oxygen Delivery Method): room air      Constitutional: Awake, alert, in no acute distress.   HEENT: Normocephalic, atraumatic, OSMAR, no proptosis or lid retraction.   Neck: supple, no acanthosis, no thyromegaly or palpable thyroid nodules.  Respiratory: Lungs clear to ausculation bilaterally.   Cardiovascular: regular rhythm, normal S1 and S2, no audible murmurs.   GI: normal bowel sounds, tenderness periumbilical region and RLQ  Extremities: No lower extremity edema, peripheral pulses present.   Skin: no rashes.   Psychiatric: AAO x 3. Normal affect/mood.     LABS  CBC - WBC/HGB/HTC/PLT: 12.18/14.0/41.1/242 (07-27-23)  BMP - Na/K/Cl/Bicarb/BUN/Cr/Gluc/AG/eGFR: 135/4.2/100/23/18/0.96/199/12/92 (07-27-23)  Ca - 8.0 (07-27-23)  Phos - 4.0 (07-27-23)  Mg - 1.9 (07-27-23)  LFT - Alb/Tprot/Tbili/Dbili/AlkPhos/ALT/AST: 3.8/--/0.4/--/51/23/14 (07-26-23)          07-26-23 @ 07:01  -  07-27-23 @ 07:00  --------------------------------------------------------  IN: 1280 mL / OUT: 925 mL / NET: 355 mL    07-27-23 @ 07:01  -  07-27-23 @ 10:00  --------------------------------------------------------  IN: 420 mL / OUT: 0 mL / NET: 420 mL        MEDICATIONS  MEDICATIONS  (STANDING):  atorvastatin 20 milliGRAM(s) Oral at bedtime  budesonide  80 MICROgram(s)/formoterol 4.5 MICROgram(s) Inhaler 2 Puff(s) Inhalation two times a day  dextrose 5%. 1000 milliLiter(s) (50 mL/Hr) IV Continuous <Continuous>  dextrose 5%. 1000 milliLiter(s) (100 mL/Hr) IV Continuous <Continuous>  dextrose 50% Injectable 12.5 Gram(s) IV Push once  dextrose 50% Injectable 25 Gram(s) IV Push once  dextrose 50% Injectable 25 Gram(s) IV Push once  glucagon  Injectable 1 milliGRAM(s) IntraMuscular once  heparin   Injectable 7500 Unit(s) SubCutaneous every 8 hours  insulin glargine Injectable (LANTUS) 16 Unit(s) SubCutaneous at bedtime  insulin lispro (ADMELOG) corrective regimen sliding scale   SubCutaneous three times a day before meals  montelukast 10 milliGRAM(s) Oral daily  tiotropium 2.5 MICROgram(s) Inhaler 2 Puff(s) Inhalation daily    MEDICATIONS  (PRN):  dextrose Oral Gel 15 Gram(s) Oral once PRN Blood Glucose LESS THAN 70 milliGRAM(s)/deciliter  HYDROmorphone  Injectable 0.5 milliGRAM(s) IV Push every 15 minutes PRN Severe Pain (7 - 10)  oxyCODONE    IR 5 milliGRAM(s) Oral every 6 hours PRN Moderate Pain (4 - 6)  oxyCODONE    IR 10 milliGRAM(s) Oral every 6 hours PRN Severe Pain (7 - 10)    ASSESSMENT / RECOMMENDATIONS    58y Male with a past medical history of HTN, DM type 2 (on metformin and glimipiride who have not seen a doctor for 1 year), COPD presented with 1 day history of sudden onset abdominal pain, found to have appendicitis, pending surgery later today.  Endocrinology has been consulted for Diabetes Managment.    A1C: 11.7 %  BUN: 18  Creatinine: 0.96  GFR: 92  Weight: 90.7  BMI: 36.6    # Type 2 diabetes mellitus with hyperglycemia  - Please start lantus .. at bedtime.   -   - Continue lispro medium dose sliding scale before meals and at bedtime.  - Patient's fingerstick glucose goal is 100-180 mg/dL.    - Discharge recommendations to be discussed.   - Patient can follow up at discharge with [facility] by calling [phone] to make an appointment.      Case discussed with Dr. Jennings. Primary team updated.       Amanda Piedra  Endocrinology Fellow    Service Pager: [phone]    SUBJECTIVE / INTERVAL HPI: Patient was seen and examined this morning.     Overnight events:  Pt is POD-1 s/p appendenctomy, patient tolerated the procedure  reports feeling fine  afebrile and hemodynamically stable  pt had turkey mann, fruits, eggs, tea and jelly for breakfast this morning    CAPILLARY BLOOD GLUCOSE & INSULIN RECEIVED  285 mg/dL (07-26 @ 21:55) - lispro 6 u   286 mg/dL (07-26 @ 22:33) - lantus 16 u + lispro 6 u  263 mg/dL (07-26 @ 23:34) - N/A  166 mg/dL (07-27 @ 06:01) - lispro 2 u      REVIEW OF SYSTEMS  Constitutional:  Negative fever, chills or loss of appetite.  Eyes:  Negative blurry vision or double vision.  Cardiovascular:  Negative for chest pain or palpitations.  Respiratory:  Negative for cough, wheezing, or shortness of breath.    Gastrointestinal:  Negative for nausea, vomiting, diarrhea, constipation, or abdominal pain.  Genitourinary:  Negative frequency, urgency or dysuria.  Neurologic:  No headache, confusion, dizziness, lightheadedness.    PHYSICAL EXAM  Vital Signs Last 24 Hrs  T(C): 37.1 (27 Jul 2023 09:00), Max: 37.1 (26 Jul 2023 22:00)  T(F): 98.8 (27 Jul 2023 09:00), Max: 98.8 (27 Jul 2023 09:00)  HR: 98 (27 Jul 2023 09:00) (60 - 98)  BP: 141/94 (27 Jul 2023 09:00) (96/55 - 159/99)  BP(mean): 85 (26 Jul 2023 21:05) (69 - 132)  RR: 18 (27 Jul 2023 09:00) (17 - 24)  SpO2: 95% (27 Jul 2023 09:00) (91% - 100%)    Parameters below as of 27 Jul 2023 09:00  Patient On (Oxygen Delivery Method): room air      Constitutional: Awake, alert, in no acute distress.   HEENT: Normocephalic, atraumatic, OSMAR, no proptosis or lid retraction.   Neck: supple, no acanthosis, no thyromegaly or palpable thyroid nodules.  Respiratory: Lungs clear to ausculation bilaterally.   Cardiovascular: regular rhythm, normal S1 and S2, no audible murmurs.   GI: normal bowel sounds, tenderness periumbilical region and RLQ  Extremities: No lower extremity edema, peripheral pulses present.   Skin: no rashes.   Psychiatric: AAO x 3. Normal affect/mood.     LABS  CBC - WBC/HGB/HTC/PLT: 12.18/14.0/41.1/242 (07-27-23)  BMP - Na/K/Cl/Bicarb/BUN/Cr/Gluc/AG/eGFR: 135/4.2/100/23/18/0.96/199/12/92 (07-27-23)  Ca - 8.0 (07-27-23)  Phos - 4.0 (07-27-23)  Mg - 1.9 (07-27-23)  LFT - Alb/Tprot/Tbili/Dbili/AlkPhos/ALT/AST: 3.8/--/0.4/--/51/23/14 (07-26-23)          07-26-23 @ 07:01  -  07-27-23 @ 07:00  --------------------------------------------------------  IN: 1280 mL / OUT: 925 mL / NET: 355 mL    07-27-23 @ 07:01  -  07-27-23 @ 10:00  --------------------------------------------------------  IN: 420 mL / OUT: 0 mL / NET: 420 mL        MEDICATIONS  MEDICATIONS  (STANDING):  atorvastatin 20 milliGRAM(s) Oral at bedtime  budesonide  80 MICROgram(s)/formoterol 4.5 MICROgram(s) Inhaler 2 Puff(s) Inhalation two times a day  dextrose 5%. 1000 milliLiter(s) (50 mL/Hr) IV Continuous <Continuous>  dextrose 5%. 1000 milliLiter(s) (100 mL/Hr) IV Continuous <Continuous>  dextrose 50% Injectable 12.5 Gram(s) IV Push once  dextrose 50% Injectable 25 Gram(s) IV Push once  dextrose 50% Injectable 25 Gram(s) IV Push once  glucagon  Injectable 1 milliGRAM(s) IntraMuscular once  heparin   Injectable 7500 Unit(s) SubCutaneous every 8 hours  insulin glargine Injectable (LANTUS) 16 Unit(s) SubCutaneous at bedtime  insulin lispro (ADMELOG) corrective regimen sliding scale   SubCutaneous three times a day before meals  montelukast 10 milliGRAM(s) Oral daily  tiotropium 2.5 MICROgram(s) Inhaler 2 Puff(s) Inhalation daily    MEDICATIONS  (PRN):  dextrose Oral Gel 15 Gram(s) Oral once PRN Blood Glucose LESS THAN 70 milliGRAM(s)/deciliter  HYDROmorphone  Injectable 0.5 milliGRAM(s) IV Push every 15 minutes PRN Severe Pain (7 - 10)  oxyCODONE    IR 5 milliGRAM(s) Oral every 6 hours PRN Moderate Pain (4 - 6)  oxyCODONE    IR 10 milliGRAM(s) Oral every 6 hours PRN Severe Pain (7 - 10)    ASSESSMENT / RECOMMENDATIONS    58y Male with a past medical history of HTN, DM type 2 (on metformin and glimipiride who have not seen a doctor for 1 year), COPD presented with 1 day history of sudden onset abdominal pain, found to have appendicitis, pending surgery later today.  Endocrinology has been consulted for Diabetes Managment.    A1C: 11.7 %  BUN: 18  Creatinine: 0.96  GFR: 92  Weight: 90.7  BMI: 36.6    # Type 2 diabetes mellitus with hyperglycemia  - Discharge recommendations:    -> please resume home med metformin 1g bid + glimipiride 4 mg   -> please start Jardiance 10 daily  - Patient's fingerstick glucose goal is 100-180 mg/dL.    - Patient can follow up at discharge with [facility] by calling [phone] to make an appointment.      Case discussed with Dr. Jennings. Primary team updated.       Amanda Piedra  Endocrinology Fellow    Service Pager: [phone]    SUBJECTIVE / INTERVAL HPI: Patient was seen and examined this morning.     Overnight events:  Pt is POD-1 s/p appendenctomy, patient tolerated the procedure  reports feeling fine  afebrile and hemodynamically stable  pt had turkey mann, fruits, eggs, tea and jelly for breakfast this morning    CAPILLARY BLOOD GLUCOSE & INSULIN RECEIVED  285 mg/dL (07-26 @ 21:55) - lispro 6 u   286 mg/dL (07-26 @ 22:33) - lantus 16 u + lispro 6 u  263 mg/dL (07-26 @ 23:34) - N/A  166 mg/dL (07-27 @ 06:01) - lispro 2 u      REVIEW OF SYSTEMS  Constitutional:  Negative fever, chills or loss of appetite.  Eyes:  Negative blurry vision or double vision.  Cardiovascular:  Negative for chest pain or palpitations.  Respiratory:  Negative for cough, wheezing, or shortness of breath.    Gastrointestinal:  Negative for nausea, vomiting, diarrhea, constipation, or abdominal pain.  Genitourinary:  Negative frequency, urgency or dysuria.  Neurologic:  No headache, confusion, dizziness, lightheadedness.    PHYSICAL EXAM  Vital Signs Last 24 Hrs  T(C): 37.1 (27 Jul 2023 09:00), Max: 37.1 (26 Jul 2023 22:00)  T(F): 98.8 (27 Jul 2023 09:00), Max: 98.8 (27 Jul 2023 09:00)  HR: 98 (27 Jul 2023 09:00) (60 - 98)  BP: 141/94 (27 Jul 2023 09:00) (96/55 - 159/99)  BP(mean): 85 (26 Jul 2023 21:05) (69 - 132)  RR: 18 (27 Jul 2023 09:00) (17 - 24)  SpO2: 95% (27 Jul 2023 09:00) (91% - 100%)    Parameters below as of 27 Jul 2023 09:00  Patient On (Oxygen Delivery Method): room air      Constitutional: Awake, alert, in no acute distress.   HEENT: Normocephalic, atraumatic, OSMAR, no proptosis or lid retraction.   Neck: supple, no acanthosis, no thyromegaly or palpable thyroid nodules.  Respiratory: Lungs clear to ausculation bilaterally.   Cardiovascular: regular rhythm, normal S1 and S2, no audible murmurs.   GI: normal bowel sounds, tenderness periumbilical region and RLQ  Extremities: No lower extremity edema, peripheral pulses present.   Skin: no rashes.   Psychiatric: AAO x 3. Normal affect/mood.     LABS  CBC - WBC/HGB/HTC/PLT: 12.18/14.0/41.1/242 (07-27-23)  BMP - Na/K/Cl/Bicarb/BUN/Cr/Gluc/AG/eGFR: 135/4.2/100/23/18/0.96/199/12/92 (07-27-23)  Ca - 8.0 (07-27-23)  Phos - 4.0 (07-27-23)  Mg - 1.9 (07-27-23)  LFT - Alb/Tprot/Tbili/Dbili/AlkPhos/ALT/AST: 3.8/--/0.4/--/51/23/14 (07-26-23)          07-26-23 @ 07:01  -  07-27-23 @ 07:00  --------------------------------------------------------  IN: 1280 mL / OUT: 925 mL / NET: 355 mL    07-27-23 @ 07:01  -  07-27-23 @ 10:00  --------------------------------------------------------  IN: 420 mL / OUT: 0 mL / NET: 420 mL        MEDICATIONS  MEDICATIONS  (STANDING):  atorvastatin 20 milliGRAM(s) Oral at bedtime  budesonide  80 MICROgram(s)/formoterol 4.5 MICROgram(s) Inhaler 2 Puff(s) Inhalation two times a day  dextrose 5%. 1000 milliLiter(s) (50 mL/Hr) IV Continuous <Continuous>  dextrose 5%. 1000 milliLiter(s) (100 mL/Hr) IV Continuous <Continuous>  dextrose 50% Injectable 12.5 Gram(s) IV Push once  dextrose 50% Injectable 25 Gram(s) IV Push once  dextrose 50% Injectable 25 Gram(s) IV Push once  glucagon  Injectable 1 milliGRAM(s) IntraMuscular once  heparin   Injectable 7500 Unit(s) SubCutaneous every 8 hours  insulin glargine Injectable (LANTUS) 16 Unit(s) SubCutaneous at bedtime  insulin lispro (ADMELOG) corrective regimen sliding scale   SubCutaneous three times a day before meals  montelukast 10 milliGRAM(s) Oral daily  tiotropium 2.5 MICROgram(s) Inhaler 2 Puff(s) Inhalation daily    MEDICATIONS  (PRN):  dextrose Oral Gel 15 Gram(s) Oral once PRN Blood Glucose LESS THAN 70 milliGRAM(s)/deciliter  HYDROmorphone  Injectable 0.5 milliGRAM(s) IV Push every 15 minutes PRN Severe Pain (7 - 10)  oxyCODONE    IR 5 milliGRAM(s) Oral every 6 hours PRN Moderate Pain (4 - 6)  oxyCODONE    IR 10 milliGRAM(s) Oral every 6 hours PRN Severe Pain (7 - 10)    ASSESSMENT / RECOMMENDATIONS    58y Male with a past medical history of HTN, DM type 2 (on metformin and glimipiride who have not seen a doctor for 1 year), COPD presented with 1 day history of sudden onset abdominal pain, found to have appendicitis, pending surgery later today.  Endocrinology has been consulted for Diabetes Managment.    A1C: 11.7 %  BUN: 18  Creatinine: 0.96  GFR: 92  Weight: 90.7  BMI: 36.6    # Type 2 diabetes mellitus with hyperglycemia  - Discharge recommendations:    -> please resume home med metformin 1g bid and please reduce to glimipiride 1 mg   -> please start Jardiance 10 daily  - Patient's fingerstick glucose goal is 100-180 mg/dL.    - Patient can follow up at discharge with [facility] by calling [phone] to make an appointment.      Case discussed with Dr. Jennings. Primary team updated.       Amanda Piedra  Endocrinology Fellow    Service Pager: [phone]    SUBJECTIVE / INTERVAL HPI: Patient was seen and examined this morning.     Overnight events:  Pt is POD-1 s/p appendenctomy, patient tolerated the procedure  reports feeling fine  afebrile and hemodynamically stable  pt had turkey mann, fruits, eggs, tea and jelly for breakfast this morning    CAPILLARY BLOOD GLUCOSE & INSULIN RECEIVED  285 mg/dL (07-26 @ 21:55) - lispro 6 u   286 mg/dL (07-26 @ 22:33) - lantus 16 u + lispro 6 u  263 mg/dL (07-26 @ 23:34) - N/A  166 mg/dL (07-27 @ 06:01) - lispro 2 u      REVIEW OF SYSTEMS  Constitutional:  Negative fever, chills or loss of appetite.  Eyes:  Negative blurry vision or double vision.  Cardiovascular:  Negative for chest pain or palpitations.  Respiratory:  Negative for cough, wheezing, or shortness of breath.    Gastrointestinal:  Negative for nausea, vomiting, diarrhea, constipation, or abdominal pain.  Genitourinary:  Negative frequency, urgency or dysuria.  Neurologic:  No headache, confusion, dizziness, lightheadedness.    PHYSICAL EXAM  Vital Signs Last 24 Hrs  T(C): 37.1 (27 Jul 2023 09:00), Max: 37.1 (26 Jul 2023 22:00)  T(F): 98.8 (27 Jul 2023 09:00), Max: 98.8 (27 Jul 2023 09:00)  HR: 98 (27 Jul 2023 09:00) (60 - 98)  BP: 141/94 (27 Jul 2023 09:00) (96/55 - 159/99)  BP(mean): 85 (26 Jul 2023 21:05) (69 - 132)  RR: 18 (27 Jul 2023 09:00) (17 - 24)  SpO2: 95% (27 Jul 2023 09:00) (91% - 100%)    Parameters below as of 27 Jul 2023 09:00  Patient On (Oxygen Delivery Method): room air      Constitutional: Awake, alert, in no acute distress.   HEENT: Normocephalic, atraumatic, OSMAR, no proptosis or lid retraction.   Neck: supple, no acanthosis, no thyromegaly or palpable thyroid nodules.  Respiratory: Lungs clear to ausculation bilaterally.   Cardiovascular: regular rhythm, normal S1 and S2, no audible murmurs.   GI: normal bowel sounds, tenderness periumbilical region and RLQ  Extremities: No lower extremity edema, peripheral pulses present.   Skin: no rashes.   Psychiatric: AAO x 3. Normal affect/mood.     LABS  CBC - WBC/HGB/HTC/PLT: 12.18/14.0/41.1/242 (07-27-23)  BMP - Na/K/Cl/Bicarb/BUN/Cr/Gluc/AG/eGFR: 135/4.2/100/23/18/0.96/199/12/92 (07-27-23)  Ca - 8.0 (07-27-23)  Phos - 4.0 (07-27-23)  Mg - 1.9 (07-27-23)  LFT - Alb/Tprot/Tbili/Dbili/AlkPhos/ALT/AST: 3.8/--/0.4/--/51/23/14 (07-26-23)          07-26-23 @ 07:01  -  07-27-23 @ 07:00  --------------------------------------------------------  IN: 1280 mL / OUT: 925 mL / NET: 355 mL    07-27-23 @ 07:01  -  07-27-23 @ 10:00  --------------------------------------------------------  IN: 420 mL / OUT: 0 mL / NET: 420 mL        MEDICATIONS  MEDICATIONS  (STANDING):  atorvastatin 20 milliGRAM(s) Oral at bedtime  budesonide  80 MICROgram(s)/formoterol 4.5 MICROgram(s) Inhaler 2 Puff(s) Inhalation two times a day  dextrose 5%. 1000 milliLiter(s) (50 mL/Hr) IV Continuous <Continuous>  dextrose 5%. 1000 milliLiter(s) (100 mL/Hr) IV Continuous <Continuous>  dextrose 50% Injectable 12.5 Gram(s) IV Push once  dextrose 50% Injectable 25 Gram(s) IV Push once  dextrose 50% Injectable 25 Gram(s) IV Push once  glucagon  Injectable 1 milliGRAM(s) IntraMuscular once  heparin   Injectable 7500 Unit(s) SubCutaneous every 8 hours  insulin glargine Injectable (LANTUS) 16 Unit(s) SubCutaneous at bedtime  insulin lispro (ADMELOG) corrective regimen sliding scale   SubCutaneous three times a day before meals  montelukast 10 milliGRAM(s) Oral daily  tiotropium 2.5 MICROgram(s) Inhaler 2 Puff(s) Inhalation daily    MEDICATIONS  (PRN):  dextrose Oral Gel 15 Gram(s) Oral once PRN Blood Glucose LESS THAN 70 milliGRAM(s)/deciliter  HYDROmorphone  Injectable 0.5 milliGRAM(s) IV Push every 15 minutes PRN Severe Pain (7 - 10)  oxyCODONE    IR 5 milliGRAM(s) Oral every 6 hours PRN Moderate Pain (4 - 6)  oxyCODONE    IR 10 milliGRAM(s) Oral every 6 hours PRN Severe Pain (7 - 10)    ASSESSMENT / RECOMMENDATIONS    58y Male with a past medical history of HTN, DM type 2 (on metformin and glimipiride who have not seen a doctor for 1 year), COPD presented with 1 day history of sudden onset abdominal pain, found to have appendicitis, pending surgery later today.  Endocrinology has been consulted for Diabetes Managment.    A1C: 11.7 %  BUN: 18  Creatinine: 0.96  GFR: 92  Weight: 90.7  BMI: 36.6    # Type 2 diabetes mellitus with hyperglycemia  - glucose above >200 after breakfast and lunch  - please increase lantus to 18 units nightly  - please keep lispro 8 units with meals  - please keep medium dose sliding scale with lispro with meals and nightly  - Patient's fingerstick glucose goal is 100-180 mg/dL.    - Patient can follow up at discharge with [facility] by calling [phone] to make an appointment.      Case discussed with Dr. Jennings. Primary team updated.       Amanda Piedra  Endocrinology Fellow    Service Pager: [phone]

## 2023-07-27 NOTE — DISCHARGE NOTE PROVIDER - HOSPITAL COURSE
58 year old male with pmhx of Dm2, htn, and hld w. no SHx presented to ED with 1 day history of new onset sharp abdominal pain. Patient states pain occured after eating a plum yesterday afternoon, started near epigastrium and radiated to RUQ. Symptoms associated with +n/+v x3/ +f/ +liquidly bm x3. Pain aggravated with food, and resolved with pain medication. ED labs significant for wbc 13.8. CT w/ dilated fluid-filled appendix with mild inflammatory change admitted w/ acute appendicitis. Now s/p laparoscopic appendectomy on 7/26. Appendix was non-gangrenous, non-perforated. His postoperative course was unremarkable with advancement of diet, passing trial of void, and pain control. On day of discharge patient was stable to be d/c'd home.

## 2023-07-27 NOTE — PROGRESS NOTE ADULT - ATTENDING COMMENTS
Pt seen on rounds this afternoon. Pt seen on rounds this afternoon.  Underwent uncomplicated appendectomy yesterday.  Diet restarted today, and he is already eating solid food without problems.    Glucose was still elevated to 286 last night, decreased to 166 this morning after 16 Lantus plus coverage  He then increased to 222 pre-lunch, almost certainly due to lack of a pre-breakfast standing dose of lispro.  Diet was again discussed in detail.    --Works 3 PM-11 PM as a  at [employer]  --Breakfast is usually eggs, sometimes with toast.  He can continue this, but change the eggs to egg whites  --Usually skips lunch--but will now start having a sandwich  --Eats supper at work.  Food is prepared by someone else for the staff. He will cut down on portions of starch at the meal, and consider having only protein and vegetables  --Will need to eliminate the sugar in his coffee, as well as the fruit drinks and regular soda  Was to have been discharged, but plans were changed due to his glucoses in the 200 range today.  Will therefore increase the Lantus to 18 units, start premeal lispro of 10 units  Regimen for discharge will still likely be metformin (increased to 1000 mg BID), glimepiride (decreased to 1 mg) and Jardiance (10 mg)  He is an obvious candidate for a GLP-1---but will do this as outpatient

## 2023-07-27 NOTE — DISCHARGE NOTE PROVIDER - CARE PROVIDER_API CALL
Be Zamora  Surgery  [address], [address]  [place] [address]  Phone: [phone]  Fax: [phone]  Follow Up Time:

## 2023-07-27 NOTE — PROGRESS NOTE ADULT - SUBJECTIVE AND OBJECTIVE BOX
STATUS POST:  Laparoscopic appendectomy  POST OPERATIVE DAY #: 1    SUBJECTIVE: Pt seen and examined by chief resident. Pt is doing well, resting comfortably on bed. Pain controlled. Clear liquid diet tolerated. Ambulating out of bed. Voiding without difficulty. No nausea or vomiting. No complaints at this time.    Vital Signs Last 24 Hrs  T(C): 36.8 (27 Jul 2023 04:19), Max: 37.1 (26 Jul 2023 22:00)  T(F): 98.2 (27 Jul 2023 04:19), Max: 98.7 (26 Jul 2023 22:00)  HR: 81 (27 Jul 2023 04:19) (60 - 83)  BP: 130/85 (27 Jul 2023 04:19) (96/55 - 159/99)  BP(mean): 85 (26 Jul 2023 21:05) (69 - 132)  RR: 17 (27 Jul 2023 04:19) (16 - 24)  SpO2: 94% (27 Jul 2023 04:19) (91% - 100%)    Parameters below as of 27 Jul 2023 04:19  Patient On (Oxygen Delivery Method): room air        I&O's Summary    26 Jul 2023 07:01  -  27 Jul 2023 06:44  --------------------------------------------------------  IN: 1280 mL / OUT: 925 mL / NET: 355 mL        Physical Exam:  General Appearance: Appears well, NAD  Pulmonary: Nonlabored breathing, no respiratory distress  Abdomen: Soft, nondisteded, nontender, incisions clean and dry and intact  Extremities: WWP, SCD's in place     LABS:                        14.0   12.18 )-----------( 242      ( 27 Jul 2023 05:45 )             41.1     07-27    135  |  100  |  18  ----------------------------<  199<H>  4.2   |  23  |  0.96    Ca    8.0<L>      27 Jul 2023 05:45  Phos  4.0     07-27  Mg     1.9     07-27    TPro  6.4  /  Alb  3.8  /  TBili  0.4  /  DBili  x   /  AST  14  /  ALT  23  /  AlkPhos  51  07-26      Urinalysis Basic - ( 27 Jul 2023 05:45 )    Color: x / Appearance: x / SG: x / pH: x  Gluc: 199 mg/dL / Ketone: x  / Bili: x / Urobili: x   Blood: x / Protein: x / Nitrite: x   Leuk Esterase: x / RBC: x / WBC x   Sq Epi: x / Non Sq Epi: x / Bacteria: x

## 2023-07-27 NOTE — PROGRESS NOTE ADULT - SUBJECTIVE AND OBJECTIVE BOX
Patient is a 58y old  Male who presents with a chief complaint of Abdominal Pain (27 Jul 2023 09:59)    INTERVAL EVENTS:  POD 1 Lap appy    SUBJECTIVE:  Patient was seen and examined at bedside. Reports feeling fine, pain controlled, no SOB, tolerating diet, no N/V. Reports no flatus, no BM. Discussed A1C >11 and need to follow-up with Endocrinology. No other complaints or events reported.    Review of systems: No fever, chills, dizziness, HA, Changes in vision, CP, dyspnea, nausea or vomiting, dysuria, changes in bowel movements, LE edema. Rest of 12 point Review of systems negative unless otherwise documented elsewhere in note.     Diet, Consistent Carbohydrate Renal/No Snacks (07-27-23 @ 07:30) [Active]      MEDICATIONS:  MEDICATIONS  (STANDING):  atorvastatin 20 milliGRAM(s) Oral at bedtime  budesonide  80 MICROgram(s)/formoterol 4.5 MICROgram(s) Inhaler 2 Puff(s) Inhalation two times a day  dextrose 5%. 1000 milliLiter(s) (100 mL/Hr) IV Continuous <Continuous>  dextrose 5%. 1000 milliLiter(s) (50 mL/Hr) IV Continuous <Continuous>  dextrose 50% Injectable 12.5 Gram(s) IV Push once  dextrose 50% Injectable 25 Gram(s) IV Push once  dextrose 50% Injectable 25 Gram(s) IV Push once  glucagon  Injectable 1 milliGRAM(s) IntraMuscular once  heparin   Injectable 7500 Unit(s) SubCutaneous every 8 hours  insulin glargine Injectable (LANTUS) 16 Unit(s) SubCutaneous at bedtime  insulin lispro (ADMELOG) corrective regimen sliding scale   SubCutaneous three times a day before meals  montelukast 10 milliGRAM(s) Oral daily  tiotropium 2.5 MICROgram(s) Inhaler 2 Puff(s) Inhalation daily    MEDICATIONS  (PRN):  dextrose Oral Gel 15 Gram(s) Oral once PRN Blood Glucose LESS THAN 70 milliGRAM(s)/deciliter  HYDROmorphone  Injectable 0.5 milliGRAM(s) IV Push every 15 minutes PRN Severe Pain (7 - 10)  oxyCODONE    IR 5 milliGRAM(s) Oral every 6 hours PRN Moderate Pain (4 - 6)  oxyCODONE    IR 10 milliGRAM(s) Oral every 6 hours PRN Severe Pain (7 - 10)      Allergies    No Known Allergies    Intolerances        OBJECTIVE:  Vital Signs Last 24 Hrs  T(C): 37.1 (27 Jul 2023 09:00), Max: 37.1 (26 Jul 2023 22:00)  T(F): 98.8 (27 Jul 2023 09:00), Max: 98.8 (27 Jul 2023 09:00)  HR: 98 (27 Jul 2023 09:00) (60 - 98)  BP: 141/94 (27 Jul 2023 09:00) (96/55 - 159/99)  BP(mean): 85 (26 Jul 2023 21:05) (69 - 132)  RR: 18 (27 Jul 2023 09:00) (17 - 24)  SpO2: 95% (27 Jul 2023 09:00) (91% - 100%)    Parameters below as of 27 Jul 2023 09:00  Patient On (Oxygen Delivery Method): room air      I&O's Summary    26 Jul 2023 07:01  -  27 Jul 2023 07:00  --------------------------------------------------------  IN: 1280 mL / OUT: 925 mL / NET: 355 mL    27 Jul 2023 07:01  -  27 Jul 2023 11:55  --------------------------------------------------------  IN: 420 mL / OUT: 475 mL / NET: -55 mL        PHYSICAL EXAM:  General: AOX3, NAD, lying in bed, speaking in full sentences, no labored breathing on RA  HEENT: AT/NC, no facial asymmetry  Lungs: good air entry, no crackles, no wheezes  Heart: RRR  Abdomen: obese, binder in place,   Extremities:  warm, no edema, right knee scaly rash( chronic from last year), no tenderness, no focal deficit       LABS:                        14.0   12.18 )-----------( 242      ( 27 Jul 2023 05:45 )             41.1     07-27    135  |  100  |  18  ----------------------------<  199<H>  4.2   |  23  |  0.96    Ca    8.0<L>      27 Jul 2023 05:45  Phos  4.0     07-27  Mg     1.9     07-27    TPro  6.4  /  Alb  3.8  /  TBili  0.4  /  DBili  x   /  AST  14  /  ALT  23  /  AlkPhos  51  07-26    LIVER FUNCTIONS - ( 26 Jul 2023 12:55 )  Alb: 3.8 g/dL / Pro: 6.4 g/dL / ALK PHOS: 51 U/L / ALT: 23 U/L / AST: 14 U/L / GGT: x             CAPILLARY BLOOD GLUCOSE      POCT Blood Glucose.: 166 mg/dL (27 Jul 2023 06:01)  POCT Blood Glucose.: 263 mg/dL (26 Jul 2023 23:34)  POCT Blood Glucose.: 286 mg/dL (26 Jul 2023 22:33)  POCT Blood Glucose.: 285 mg/dL (26 Jul 2023 21:55)  POCT Blood Glucose.: 285 mg/dL (26 Jul 2023 20:00)  POCT Blood Glucose.: 221 mg/dL (26 Jul 2023 17:23)  POCT Blood Glucose.: 207 mg/dL (26 Jul 2023 15:05)  POCT Blood Glucose.: 244 mg/dL (26 Jul 2023 12:46)    Urinalysis Basic - ( 27 Jul 2023 05:45 )    Color: x / Appearance: x / SG: x / pH: x  Gluc: 199 mg/dL / Ketone: x  / Bili: x / Urobili: x   Blood: x / Protein: x / Nitrite: x   Leuk Esterase: x / RBC: x / WBC x   Sq Epi: x / Non Sq Epi: x / Bacteria: x        MICRODATA:      RADIOLOGY/OTHER STUDIES:

## 2023-07-27 NOTE — DISCHARGE NOTE PROVIDER - NSDCFUADDAPPT_GEN_ALL_CORE_FT
Please follow up with Dr. Zamora in one week; you may call the office to make an appointment at your earliest convenience.    Please follow up with [facility] in one week; you may call the office at [phone] make an appointment at your earliest convenience.   Please follow up with Dr. Zamora in one week; you may call the office to make an appointment at your earliest convenience.    Please follow up with [facility] in one week; you may call the office at [phone] to make an appointment at your earliest convenience.

## 2023-07-27 NOTE — DISCHARGE NOTE NURSING/CASE MANAGEMENT/SOCIAL WORK - PATIENT PORTAL LINK FT
You can access the FollowMyHealth Patient Portal offered by [facility] by registering at the following website: [email or web address]. By joining [facility]’s FollowMyHealth portal, you will also be able to view your health information using other applications (apps) compatible with our system.

## 2023-07-27 NOTE — DISCHARGE NOTE PROVIDER - NSDCMRMEDTOKEN_GEN_ALL_CORE_FT
atorvastatin 20 mg oral tablet: 1 orally once a day  glimepiride 4 mg oral tablet: 1 orally every 12 hours  losartan-hydrochlorothiazide 50 mg-12.5 mg oral tablet: 1 orally once a day  metFORMIN 1000 mg oral tablet: 1 orally every 12 hours  montelukast 10 mg oral tablet: 1 orally once a day  pioglitazone 45 mg oral tablet: 1 orally once a day  Trelegy Ellipta 100 mcg-62.5 mcg-25 mcg/inh inhalation powder: 1 inhaled once a day   alcohol swabs: apply topically to the affected areas 4 times a day  atorvastatin 20 mg oral tablet: 1 orally once a day  glimepiride 4 mg oral tablet: 1 orally every 12 hours  Glucometer (per patient&#x27;s insurance): test blood sugar 4 times a day.  dispense #1 glucometer.  Lancets: 1 application subcutaneously 4 times a day  losartan-hydrochlorothiazide 50 mg-12.5 mg oral tablet: 1 orally once a day  metFORMIN 1000 mg oral tablet: 1 orally every 12 hours  montelukast 10 mg oral tablet: 1 orally once a day  pioglitazone 45 mg oral tablet: 1 orally once a day  test strips (per patient&#x27;s insurance): one application subcutaneously 4 times a day **Compatibile with patient&#x27;s glucometer**  Trelegy Ellipta 100 mcg-62.5 mcg-25 mcg/inh inhalation powder: 1 inhaled once a day   alcohol swabs: apply topically to the affected areas 4 times a day  atorvastatin 20 mg oral tablet: 1 orally once a day  glimepiride 1 mg oral tablet: 1 tab(s) orally once a day  Glucometer (per patient&#x27;s insurance): test blood sugar 4 times a day.  dispense #1 glucometer.  Lancets: 1 application subcutaneously 4 times a day  losartan-hydrochlorothiazide 50 mg-12.5 mg oral tablet: 1 orally once a day  metFORMIN 1000 mg oral tablet: 1 orally every 12 hours  montelukast 10 mg oral tablet: 1 orally once a day  pioglitazone 45 mg oral tablet: 1 orally once a day  test strips (per patient&#x27;s insurance): one application subcutaneously 4 times a day **Compatibile with patient&#x27;s glucometer**  Trelegy Ellipta 100 mcg-62.5 mcg-25 mcg/inh inhalation powder: 1 inhaled once a day   alcohol swabs: apply topically to the affected areas 4 times a day  atorvastatin 20 mg oral tablet: 1 orally once a day  glimepiride 2 mg oral tablet: 1 orally once a day  Glucometer (per patient&#x27;s insurance): test blood sugar 4 times a day.  dispense #1 glucometer.  Lancets: 1 application subcutaneously 4 times a day  losartan-hydrochlorothiazide 50 mg-12.5 mg oral tablet: 1 orally once a day  metFORMIN 1000 mg oral tablet: 1 orally every 12 hours  montelukast 10 mg oral tablet: 1 orally once a day  pioglitazone 45 mg oral tablet: 1 orally once a day  test strips (per patient&#x27;s insurance): one application subcutaneously 4 times a day **Compatibile with patient&#x27;s glucometer**  Trelegy Ellipta 100 mcg-62.5 mcg-25 mcg/inh inhalation powder: 1 inhaled once a day

## 2023-07-27 NOTE — DISCHARGE NOTE PROVIDER - NSDCFUADDINST_GEN_ALL_CORE_FT
General Discharge Instructions:  Please resume all regular home medications unless specifically advised not to take a particular medication. Also, please take any new medications as prescribed.  Please get plenty of rest, continue to ambulate several times per day, and drink adequate amounts of fluids. Avoid lifting weights greater than 5-10 lbs until you follow-up with your surgeon, who will instruct you further regarding activity restrictions.  Avoid driving or operating heavy machinery while taking pain medications.  Please follow-up with your surgeon and Primary Care Provider (PCP) as advised.  Incision Care:  *Please call your doctor if you have increased pain, swelling, redness, or drainage from the incision site.  *Avoid swimming and baths until your follow-up appointment.  *You may shower, and wash surgical incisions with a mild soap and warm water. Gently pat the area dry.    Warning Signs:  Please call your doctor if you experience the following:  *You experience new chest pain, pressure, squeezing or tightness.  *New or worsening cough, shortness of breath, or wheeze.  *If you are vomiting and cannot keep down fluids or your medications.  *You are getting dehydrated due to continued vomiting, diarrhea, or other reasons. Signs of dehydration include dry mouth, rapid heartbeat, or feeling dizzy or faint when standing.  *You see blood or dark/black material when you vomit or have a bowel movement.  *You experience burning when you urinate, have blood in your urine, or experience a discharge.  *Your pain is not improving within 8-12 hours or is not gone within 24 hours. Call or return immediately if your pain is getting worse, changes location, or moves to your chest or back.  *You have shaking chills, or fever greater than 101.5 degrees Fahrenheit or 38 degrees Celsius.  *Any change in your symptoms, or any new symptoms that concern you. Please continue to take metformin and glimepiride as prescribed for your diabetes. You have also been sent jardiance 10mg daily and follow-up with [facility].    General Discharge Instructions:  Please resume all regular home medications unless specifically advised not to take a particular medication. Also, please take any new medications as prescribed.  Please get plenty of rest, continue to ambulate several times per day, and drink adequate amounts of fluids. Avoid lifting weights greater than 5-10 lbs until you follow-up with your surgeon, who will instruct you further regarding activity restrictions.  Avoid driving or operating heavy machinery while taking pain medications.  Please follow-up with your surgeon and Primary Care Provider (PCP) as advised.  Incision Care:  *Please call your doctor if you have increased pain, swelling, redness, or drainage from the incision site.  *Avoid swimming and baths until your follow-up appointment.  *You may shower, and wash surgical incisions with a mild soap and warm water. Gently pat the area dry.    Warning Signs:  Please call your doctor if you experience the following:  *You experience new chest pain, pressure, squeezing or tightness.  *New or worsening cough, shortness of breath, or wheeze.  *If you are vomiting and cannot keep down fluids or your medications.  *You are getting dehydrated due to continued vomiting, diarrhea, or other reasons. Signs of dehydration include dry mouth, rapid heartbeat, or feeling dizzy or faint when standing.  *You see blood or dark/black material when you vomit or have a bowel movement.  *You experience burning when you urinate, have blood in your urine, or experience a discharge.  *Your pain is not improving within 8-12 hours or is not gone within 24 hours. Call or return immediately if your pain is getting worse, changes location, or moves to your chest or back.  *You have shaking chills, or fever greater than 101.5 degrees Fahrenheit or 38 degrees Celsius.  *Any change in your symptoms, or any new symptoms that concern you. Please continue to take metformin 1000mg BID and glimepiride 1mg daily for your diabetes. You have also been sent jardiance 10mg daily, please start taking Jardiance on Sunday 7/30. Please follow-up with [facility] for further management of your diabetes.     General Discharge Instructions:  Please resume all regular home medications unless specifically advised not to take a particular medication. Also, please take any new medications as prescribed.  Please get plenty of rest, continue to ambulate several times per day, and drink adequate amounts of fluids. Avoid lifting weights greater than 5-10 lbs until you follow-up with your surgeon, who will instruct you further regarding activity restrictions.  Avoid driving or operating heavy machinery while taking pain medications.  Please follow-up with your surgeon and Primary Care Provider (PCP) as advised.  Incision Care:  *Please call your doctor if you have increased pain, swelling, redness, or drainage from the incision site.  *Avoid swimming and baths until your follow-up appointment.  *You may shower, and wash surgical incisions with a mild soap and warm water. Gently pat the area dry.    Warning Signs:  Please call your doctor if you experience the following:  *You experience new chest pain, pressure, squeezing or tightness.  *New or worsening cough, shortness of breath, or wheeze.  *If you are vomiting and cannot keep down fluids or your medications.  *You are getting dehydrated due to continued vomiting, diarrhea, or other reasons. Signs of dehydration include dry mouth, rapid heartbeat, or feeling dizzy or faint when standing.  *You see blood or dark/black material when you vomit or have a bowel movement.  *You experience burning when you urinate, have blood in your urine, or experience a discharge.  *Your pain is not improving within 8-12 hours or is not gone within 24 hours. Call or return immediately if your pain is getting worse, changes location, or moves to your chest or back.  *You have shaking chills, or fever greater than 101.5 degrees Fahrenheit or 38 degrees Celsius.  *Any change in your symptoms, or any new symptoms that concern you. Please continue to take metformin 1000mg BID and glimepiride 2mg daily for your diabetes. You have also been sent jardiance 10mg daily, please start taking Jardiance on Sunday 7/30. Please follow-up with [facility] for further management of your diabetes.     General Discharge Instructions:  Please resume all regular home medications unless specifically advised not to take a particular medication. Also, please take any new medications as prescribed.  Please get plenty of rest, continue to ambulate several times per day, and drink adequate amounts of fluids. Avoid lifting weights greater than 5-10 lbs until you follow-up with your surgeon, who will instruct you further regarding activity restrictions.  Avoid driving or operating heavy machinery while taking pain medications.  Please follow-up with your surgeon and Primary Care Provider (PCP) as advised.  Incision Care:  *Please call your doctor if you have increased pain, swelling, redness, or drainage from the incision site.  *Avoid swimming and baths until your follow-up appointment.  *You may shower, and wash surgical incisions with a mild soap and warm water. Gently pat the area dry.    Warning Signs:  Please call your doctor if you experience the following:  *You experience new chest pain, pressure, squeezing or tightness.  *New or worsening cough, shortness of breath, or wheeze.  *If you are vomiting and cannot keep down fluids or your medications.  *You are getting dehydrated due to continued vomiting, diarrhea, or other reasons. Signs of dehydration include dry mouth, rapid heartbeat, or feeling dizzy or faint when standing.  *You see blood or dark/black material when you vomit or have a bowel movement.  *You experience burning when you urinate, have blood in your urine, or experience a discharge.  *Your pain is not improving within 8-12 hours or is not gone within 24 hours. Call or return immediately if your pain is getting worse, changes location, or moves to your chest or back.  *You have shaking chills, or fever greater than 101.5 degrees Fahrenheit or 38 degrees Celsius.  *Any change in your symptoms, or any new symptoms that concern you.

## 2023-07-28 VITALS
RESPIRATION RATE: 18 BRPM | TEMPERATURE: 99 F | DIASTOLIC BLOOD PRESSURE: 95 MMHG | HEART RATE: 95 BPM | OXYGEN SATURATION: 95 % | SYSTOLIC BLOOD PRESSURE: 143 MMHG

## 2023-07-28 LAB
GLUCOSE BLDC GLUCOMTR-MCNC: 157 MG/DL — HIGH (ref 70–99)
GLUCOSE BLDC GLUCOMTR-MCNC: 172 MG/DL — HIGH (ref 70–99)
GLUCOSE BLDC GLUCOMTR-MCNC: 173 MG/DL — HIGH (ref 70–99)

## 2023-07-28 PROCEDURE — C1889: CPT

## 2023-07-28 PROCEDURE — 80048 BASIC METABOLIC PNL TOTAL CA: CPT

## 2023-07-28 PROCEDURE — 85025 COMPLETE CBC W/AUTO DIFF WBC: CPT

## 2023-07-28 PROCEDURE — 80053 COMPREHEN METABOLIC PANEL: CPT

## 2023-07-28 PROCEDURE — 76705 ECHO EXAM OF ABDOMEN: CPT

## 2023-07-28 PROCEDURE — 83735 ASSAY OF MAGNESIUM: CPT

## 2023-07-28 PROCEDURE — 85027 COMPLETE CBC AUTOMATED: CPT

## 2023-07-28 PROCEDURE — 74177 CT ABD & PELVIS W/CONTRAST: CPT | Mod: MA

## 2023-07-28 PROCEDURE — 99232 SBSQ HOSP IP/OBS MODERATE 35: CPT

## 2023-07-28 PROCEDURE — 94640 AIRWAY INHALATION TREATMENT: CPT

## 2023-07-28 PROCEDURE — 83036 HEMOGLOBIN GLYCOSYLATED A1C: CPT

## 2023-07-28 PROCEDURE — 96375 TX/PRO/DX INJ NEW DRUG ADDON: CPT

## 2023-07-28 PROCEDURE — 82962 GLUCOSE BLOOD TEST: CPT

## 2023-07-28 PROCEDURE — 99285 EMERGENCY DEPT VISIT HI MDM: CPT | Mod: 25

## 2023-07-28 PROCEDURE — 83690 ASSAY OF LIPASE: CPT

## 2023-07-28 PROCEDURE — 96374 THER/PROPH/DIAG INJ IV PUSH: CPT

## 2023-07-28 PROCEDURE — 84100 ASSAY OF PHOSPHORUS: CPT

## 2023-07-28 PROCEDURE — 71045 X-RAY EXAM CHEST 1 VIEW: CPT

## 2023-07-28 PROCEDURE — 99232 SBSQ HOSP IP/OBS MODERATE 35: CPT | Mod: GC

## 2023-07-28 PROCEDURE — 88304 TISSUE EXAM BY PATHOLOGIST: CPT

## 2023-07-28 PROCEDURE — 36415 COLL VENOUS BLD VENIPUNCTURE: CPT

## 2023-07-28 PROCEDURE — 84484 ASSAY OF TROPONIN QUANT: CPT

## 2023-07-28 PROCEDURE — C9399: CPT

## 2023-07-28 PROCEDURE — 93005 ELECTROCARDIOGRAM TRACING: CPT

## 2023-07-28 RX ORDER — GLIMEPIRIDE 1 MG
1 TABLET ORAL
Qty: 0 | Refills: 0 | DISCHARGE

## 2023-07-28 RX ORDER — EMPAGLIFLOZIN 10 MG/1
1 TABLET, FILM COATED ORAL
Qty: 30 | Refills: 1
Start: 2023-07-28

## 2023-07-28 RX ADMIN — Medication 8 UNIT(S): at 08:58

## 2023-07-28 RX ADMIN — Medication 2: at 12:46

## 2023-07-28 RX ADMIN — HEPARIN SODIUM 7500 UNIT(S): 5000 INJECTION INTRAVENOUS; SUBCUTANEOUS at 12:25

## 2023-07-28 RX ADMIN — Medication 8 UNIT(S): at 12:45

## 2023-07-28 RX ADMIN — BUDESONIDE AND FORMOTEROL FUMARATE DIHYDRATE 2 PUFF(S): 160; 4.5 AEROSOL RESPIRATORY (INHALATION) at 10:51

## 2023-07-28 RX ADMIN — HEPARIN SODIUM 7500 UNIT(S): 5000 INJECTION INTRAVENOUS; SUBCUTANEOUS at 03:11

## 2023-07-28 RX ADMIN — Medication 2: at 08:59

## 2023-07-28 RX ADMIN — MONTELUKAST 10 MILLIGRAM(S): 4 TABLET, CHEWABLE ORAL at 12:25

## 2023-07-28 NOTE — PROGRESS NOTE ADULT - SUBJECTIVE AND OBJECTIVE BOX
Patient is a 58y old  Male who presents with a chief complaint of Acute appendicitis (28 Jul 2023 07:11)    INTERVAL EVENTS:    SUBJECTIVE:  Patient was seen and examined at bedside. Patient reports feeling fine, denies any complaints. Pain controlled, tolerating diet, having BMs. Endocrinology following for FS control. No other complaints or events reported.    Review of systems: No fever, chills, dizziness, HA, Changes in vision, CP, dyspnea, nausea or vomiting, dysuria, changes in bowel movements, LE edema. Rest of 12 point Review of systems negative unless otherwise documented elsewhere in note.     Diet, Consistent Carbohydrate Renal/No Snacks (07-27-23 @ 07:30) [Active]      MEDICATIONS:  MEDICATIONS  (STANDING):  atorvastatin 20 milliGRAM(s) Oral at bedtime  budesonide  80 MICROgram(s)/formoterol 4.5 MICROgram(s) Inhaler 2 Puff(s) Inhalation two times a day  dextrose 5%. 1000 milliLiter(s) (50 mL/Hr) IV Continuous <Continuous>  dextrose 5%. 1000 milliLiter(s) (100 mL/Hr) IV Continuous <Continuous>  dextrose 50% Injectable 25 Gram(s) IV Push once  dextrose 50% Injectable 12.5 Gram(s) IV Push once  dextrose 50% Injectable 25 Gram(s) IV Push once  glucagon  Injectable 1 milliGRAM(s) IntraMuscular once  heparin   Injectable 7500 Unit(s) SubCutaneous every 8 hours  insulin glargine Injectable (LANTUS) 18 Unit(s) SubCutaneous at bedtime  insulin lispro (ADMELOG) corrective regimen sliding scale   SubCutaneous three times a day before meals  insulin lispro Injectable (ADMELOG) 8 Unit(s) SubCutaneous before breakfast  insulin lispro Injectable (ADMELOG) 8 Unit(s) SubCutaneous before lunch  insulin lispro Injectable (ADMELOG) 8 Unit(s) SubCutaneous before dinner  montelukast 10 milliGRAM(s) Oral daily  tiotropium 2.5 MICROgram(s) Inhaler 2 Puff(s) Inhalation daily    MEDICATIONS  (PRN):  dextrose Oral Gel 15 Gram(s) Oral once PRN Blood Glucose LESS THAN 70 milliGRAM(s)/deciliter  HYDROmorphone  Injectable 0.5 milliGRAM(s) IV Push every 15 minutes PRN Severe Pain (7 - 10)  oxyCODONE    IR 5 milliGRAM(s) Oral every 6 hours PRN Moderate Pain (4 - 6)  oxyCODONE    IR 10 milliGRAM(s) Oral every 6 hours PRN Severe Pain (7 - 10)      Allergies    No Known Allergies    Intolerances        OBJECTIVE:  Vital Signs Last 24 Hrs  T(C): 36.7 (28 Jul 2023 08:30), Max: 36.9 (27 Jul 2023 16:31)  T(F): 98.1 (28 Jul 2023 08:30), Max: 98.5 (27 Jul 2023 19:54)  HR: 90 (28 Jul 2023 08:30) (90 - 105)  BP: 147/92 (28 Jul 2023 08:30) (116/79 - 153/69)  BP(mean): --  RR: 18 (28 Jul 2023 08:30) (17 - 18)  SpO2: 98% (28 Jul 2023 08:30) (94% - 98%)    Parameters below as of 28 Jul 2023 08:30  Patient On (Oxygen Delivery Method): room air      I&O's Summary    27 Jul 2023 07:01  -  28 Jul 2023 07:00  --------------------------------------------------------  IN: 800 mL / OUT: 1200 mL / NET: -400 mL    28 Jul 2023 07:01  -  28 Jul 2023 10:17  --------------------------------------------------------  IN: 310 mL / OUT: 300 mL / NET: 10 mL        PHYSICAL EXAM:  General: AOX3, NAD, lying in bed, speaking in full sentences, no labored breathing on RA  HEENT: AT/NC, no facial asymmetry  Lungs: good air entry, no crackles, no wheezes  Heart: RRR  Abdomen: obese, soft, + BS  Extremities:  warm, no edema, right knee scaly rash( chronic from last year), no tenderness, no focal deficit       LABS:                        14.0   12.18 )-----------( 242      ( 27 Jul 2023 05:45 )             41.1     07-27    135  |  100  |  18  ----------------------------<  199<H>  4.2   |  23  |  0.96    Ca    8.0<L>      27 Jul 2023 05:45  Phos  4.0     07-27  Mg     1.9     07-27    TPro  6.4  /  Alb  3.8  /  TBili  0.4  /  DBili  x   /  AST  14  /  ALT  23  /  AlkPhos  51  07-26    LIVER FUNCTIONS - ( 26 Jul 2023 12:55 )  Alb: 3.8 g/dL / Pro: 6.4 g/dL / ALK PHOS: 51 U/L / ALT: 23 U/L / AST: 14 U/L / GGT: x             CAPILLARY BLOOD GLUCOSE      POCT Blood Glucose.: 172 mg/dL (28 Jul 2023 08:52)  POCT Blood Glucose.: 157 mg/dL (28 Jul 2023 06:25)  POCT Blood Glucose.: 228 mg/dL (27 Jul 2023 22:00)  POCT Blood Glucose.: 278 mg/dL (27 Jul 2023 17:00)  POCT Blood Glucose.: 254 mg/dL (27 Jul 2023 15:50)  POCT Blood Glucose.: 222 mg/dL (27 Jul 2023 12:30)    Urinalysis Basic - ( 27 Jul 2023 05:45 )    Color: x / Appearance: x / SG: x / pH: x  Gluc: 199 mg/dL / Ketone: x  / Bili: x / Urobili: x   Blood: x / Protein: x / Nitrite: x   Leuk Esterase: x / RBC: x / WBC x   Sq Epi: x / Non Sq Epi: x / Bacteria: x        MICRODATA:      RADIOLOGY/OTHER STUDIES:

## 2023-07-28 NOTE — PROGRESS NOTE ADULT - ATTENDING COMMENTS
Pt seen on rounds this afternoon.  Continues to feel well, PO intake excellent  Glucose was still high at 228 last night post supper, but he only received sliding scale coverage before the meal--we were not notified that the pt was staying in the hospital in time for us to order pre-meal insulin  He then decreased to 157 this morning after 18 units Lantus last night (plus 4 units coverage), and was 173 before lunch with a pre-breakfast dose of 10 units lispro  Will be discharged today.  Although our original plan was for metformin, glimepiride (at a lower dose) plus Jardiance, it turns out that the pt does not have insurance.  This clearly rules out Jardiance, (and also would have ruled out insulin, even if there was some question about discharging him on it)  Will therefore discharge on metformin plus glimepiride 2 mg/day (rather than 1 mg/day) only  Again discussed his diet with him (see yesterday's note)  Also discussed with him once again about the long risks of hepatic steatosis and the need for weight loss

## 2023-07-28 NOTE — PROGRESS NOTE ADULT - SUBJECTIVE AND OBJECTIVE BOX
SUBJECTIVE / INTERVAL HPI: Patient was seen and examined this morning.     Overnight events:  Pt is POD-2 s/p appendenctomy,     Endocrine course  7/26: lantus 16 + miss  7/27: lantus 18 + lispro 8 units + miss    CAPILLARY BLOOD GLUCOSE & INSULIN RECEIVED  222 mg/dL (07-27 @ 12:30) - 4 u lispro miss  254 mg/dL (07-27 @ 15:50)  278 mg/dL (07-27 @ 17:00) - lispro 6 miss  228 mg/dL (07-27 @ 22:00) - lispro 4 + lantus 18      REVIEW OF SYSTEMS  Constitutional:  Negative fever, chills or loss of appetite.  Eyes:  Negative blurry vision or double vision.  Cardiovascular:  Negative for chest pain or palpitations.  Respiratory:  Negative for cough, wheezing, or shortness of breath.    Gastrointestinal:  Negative for nausea, vomiting, diarrhea, constipation, or abdominal pain.  Genitourinary:  Negative frequency, urgency or dysuria.  Neurologic:  No headache, confusion, dizziness, lightheadedness.    PHYSICAL EXAM  Vital Signs Last 24 Hrs  T(C): 36.9 (28 Jul 2023 05:12), Max: 37.1 (27 Jul 2023 09:00)  T(F): 98.5 (28 Jul 2023 05:12), Max: 98.8 (27 Jul 2023 09:00)  HR: 91 (28 Jul 2023 05:12) (90 - 105)  BP: 116/79 (28 Jul 2023 05:12) (116/79 - 153/69)  BP(mean): --  RR: 17 (28 Jul 2023 05:12) (17 - 18)  SpO2: 95% (28 Jul 2023 05:12) (94% - 98%)    Parameters below as of 28 Jul 2023 05:12  Patient On (Oxygen Delivery Method): room air      Constitutional: Awake, alert, in no acute distress.   HEENT: Normocephalic, atraumatic, OSMAR, no proptosis or lid retraction.   Neck: supple, no acanthosis, no thyromegaly or palpable thyroid nodules.  Respiratory: Lungs clear to ausculation bilaterally.   Cardiovascular: regular rhythm, normal S1 and S2, no audible murmurs.   GI: normal bowel sounds, tenderness periumbilical region and RLQ  Extremities: No lower extremity edema, peripheral pulses present.   Skin: no rashes.   Psychiatric: AAO x 3. Normal affect/mood.     LABS  CBC - WBC/HGB/HTC/PLT: 12.18/14.0/41.1/242 (07-27-23)  BMP - Na/K/Cl/Bicarb/BUN/Cr/Gluc/AG/eGFR: 135/4.2/100/23/18/0.96/199/12/92 (07-27-23)  Ca - 8.0 (07-27-23)  Phos - 4.0 (07-27-23)  Mg - 1.9 (07-27-23)  LFT - Alb/Tprot/Tbili/Dbili/AlkPhos/ALT/AST: 3.8/--/0.4/--/51/23/14 (07-26-23)          07-26-23 @ 07:01  -  07-27-23 @ 07:00  --------------------------------------------------------  IN: 1280 mL / OUT: 925 mL / NET: 355 mL    07-27-23 @ 07:01  -  07-28-23 @ 06:06  --------------------------------------------------------  IN: 800 mL / OUT: 1200 mL / NET: -400 mL        MEDICATIONS  MEDICATIONS  (STANDING):  atorvastatin 20 milliGRAM(s) Oral at bedtime  budesonide  80 MICROgram(s)/formoterol 4.5 MICROgram(s) Inhaler 2 Puff(s) Inhalation two times a day  dextrose 5%. 1000 milliLiter(s) (100 mL/Hr) IV Continuous <Continuous>  dextrose 5%. 1000 milliLiter(s) (50 mL/Hr) IV Continuous <Continuous>  dextrose 50% Injectable 12.5 Gram(s) IV Push once  dextrose 50% Injectable 25 Gram(s) IV Push once  dextrose 50% Injectable 25 Gram(s) IV Push once  glucagon  Injectable 1 milliGRAM(s) IntraMuscular once  heparin   Injectable 7500 Unit(s) SubCutaneous every 8 hours  insulin glargine Injectable (LANTUS) 18 Unit(s) SubCutaneous at bedtime  insulin lispro (ADMELOG) corrective regimen sliding scale   SubCutaneous three times a day before meals  insulin lispro Injectable (ADMELOG) 8 Unit(s) SubCutaneous before dinner  insulin lispro Injectable (ADMELOG) 8 Unit(s) SubCutaneous before lunch  insulin lispro Injectable (ADMELOG) 8 Unit(s) SubCutaneous before breakfast  montelukast 10 milliGRAM(s) Oral daily  tiotropium 2.5 MICROgram(s) Inhaler 2 Puff(s) Inhalation daily    MEDICATIONS  (PRN):  dextrose Oral Gel 15 Gram(s) Oral once PRN Blood Glucose LESS THAN 70 milliGRAM(s)/deciliter  HYDROmorphone  Injectable 0.5 milliGRAM(s) IV Push every 15 minutes PRN Severe Pain (7 - 10)  oxyCODONE    IR 5 milliGRAM(s) Oral every 6 hours PRN Moderate Pain (4 - 6)  oxyCODONE    IR 10 milliGRAM(s) Oral every 6 hours PRN Severe Pain (7 - 10)    ASSESSMENT / RECOMMENDATIONS    58y Male with a past medical history of HTN, DM type 2 (on metformin and glimipiride who have not seen a doctor for 1 year), COPD presented with 1 day history of sudden onset abdominal pain, found to have appendicitis, pending surgery later today.  Endocrinology has been consulted for Diabetes Managment.    A1C: 11.7 %  BUN: 18  Creatinine: 0.96  GFR: 92  Weight: 90.7  BMI: 36.6      # Type 2 diabetes mellitus with hyperglycemia  - lantus nightly:   - lispro with meals:   - please keep medium dose sliding scale with lispro with meals and nightly  - Patient's fingerstick glucose goal is 100-180 mg/dL.    - Patient can follow up at discharge with [facility] by calling [phone] to make an appointment.      Case discussed with Dr. Jennings. Primary team updated.       Amanda Piedra  Endocrinology Fellow    Service Pager: [phone]    SUBJECTIVE / INTERVAL HPI: Patient was seen and examined this morning.     Overnight events:  Pt is POD-2 s/p appendenctomy,     Endocrine course  7/26: appendectomy, lantus 16 + miss  7/27: lantus 18 + lispro 8 units + miss    CAPILLARY BLOOD GLUCOSE & INSULIN RECEIVED  222 mg/dL (07-27 @ 12:30) - 4 u lispro miss  254 mg/dL (07-27 @ 15:50)  278 mg/dL (07-27 @ 17:00) - lispro 6 miss  228 mg/dL (07-27 @ 22:00) - lispro 4 + lantus 18  157 @ 6:30am - 2 u lispro miss + lispro 8  172 @ 9am      REVIEW OF SYSTEMS  Constitutional:  Negative fever, chills or loss of appetite.  Eyes:  Negative blurry vision or double vision.  Cardiovascular:  Negative for chest pain or palpitations.  Respiratory:  Negative for cough, wheezing, or shortness of breath.    Gastrointestinal:  Negative for nausea, vomiting, diarrhea, constipation, or abdominal pain.  Genitourinary:  Negative frequency, urgency or dysuria.  Neurologic:  No headache, confusion, dizziness, lightheadedness.    PHYSICAL EXAM  Vital Signs Last 24 Hrs  T(C): 36.9 (28 Jul 2023 05:12), Max: 37.1 (27 Jul 2023 09:00)  T(F): 98.5 (28 Jul 2023 05:12), Max: 98.8 (27 Jul 2023 09:00)  HR: 91 (28 Jul 2023 05:12) (90 - 105)  BP: 116/79 (28 Jul 2023 05:12) (116/79 - 153/69)  BP(mean): --  RR: 17 (28 Jul 2023 05:12) (17 - 18)  SpO2: 95% (28 Jul 2023 05:12) (94% - 98%)    Parameters below as of 28 Jul 2023 05:12  Patient On (Oxygen Delivery Method): room air      Constitutional: Awake, alert, in no acute distress.   HEENT: Normocephalic, atraumatic, OSMAR, no proptosis or lid retraction.   Neck: supple, no acanthosis, no thyromegaly or palpable thyroid nodules.  Respiratory: Lungs clear to ausculation bilaterally.   Cardiovascular: regular rhythm, normal S1 and S2, no audible murmurs.   GI: normal bowel sounds, tenderness periumbilical region and RLQ  Extremities: No lower extremity edema, peripheral pulses present.   Skin: no rashes.   Psychiatric: AAO x 3. Normal affect/mood.     LABS  CBC - WBC/HGB/HTC/PLT: 12.18/14.0/41.1/242 (07-27-23)  BMP - Na/K/Cl/Bicarb/BUN/Cr/Gluc/AG/eGFR: 135/4.2/100/23/18/0.96/199/12/92 (07-27-23)  Ca - 8.0 (07-27-23)  Phos - 4.0 (07-27-23)  Mg - 1.9 (07-27-23)  LFT - Alb/Tprot/Tbili/Dbili/AlkPhos/ALT/AST: 3.8/--/0.4/--/51/23/14 (07-26-23)          07-26-23 @ 07:01  -  07-27-23 @ 07:00  --------------------------------------------------------  IN: 1280 mL / OUT: 925 mL / NET: 355 mL    07-27-23 @ 07:01  -  07-28-23 @ 06:06  --------------------------------------------------------  IN: 800 mL / OUT: 1200 mL / NET: -400 mL        MEDICATIONS  MEDICATIONS  (STANDING):  atorvastatin 20 milliGRAM(s) Oral at bedtime  budesonide  80 MICROgram(s)/formoterol 4.5 MICROgram(s) Inhaler 2 Puff(s) Inhalation two times a day  dextrose 5%. 1000 milliLiter(s) (100 mL/Hr) IV Continuous <Continuous>  dextrose 5%. 1000 milliLiter(s) (50 mL/Hr) IV Continuous <Continuous>  dextrose 50% Injectable 12.5 Gram(s) IV Push once  dextrose 50% Injectable 25 Gram(s) IV Push once  dextrose 50% Injectable 25 Gram(s) IV Push once  glucagon  Injectable 1 milliGRAM(s) IntraMuscular once  heparin   Injectable 7500 Unit(s) SubCutaneous every 8 hours  insulin glargine Injectable (LANTUS) 18 Unit(s) SubCutaneous at bedtime  insulin lispro (ADMELOG) corrective regimen sliding scale   SubCutaneous three times a day before meals  insulin lispro Injectable (ADMELOG) 8 Unit(s) SubCutaneous before dinner  insulin lispro Injectable (ADMELOG) 8 Unit(s) SubCutaneous before lunch  insulin lispro Injectable (ADMELOG) 8 Unit(s) SubCutaneous before breakfast  montelukast 10 milliGRAM(s) Oral daily  tiotropium 2.5 MICROgram(s) Inhaler 2 Puff(s) Inhalation daily    MEDICATIONS  (PRN):  dextrose Oral Gel 15 Gram(s) Oral once PRN Blood Glucose LESS THAN 70 milliGRAM(s)/deciliter  HYDROmorphone  Injectable 0.5 milliGRAM(s) IV Push every 15 minutes PRN Severe Pain (7 - 10)  oxyCODONE    IR 5 milliGRAM(s) Oral every 6 hours PRN Moderate Pain (4 - 6)  oxyCODONE    IR 10 milliGRAM(s) Oral every 6 hours PRN Severe Pain (7 - 10)    ASSESSMENT / RECOMMENDATIONS    58y Male with a past medical history of HTN, DM type 2 (on metformin and glimipiride who have not seen a doctor for 1 year), COPD presented with 1 day history of sudden onset abdominal pain, found to have appendicitis, pending surgery later today.  Endocrinology has been consulted for Diabetes Managment.    A1C: 11.7 %  BUN: 18  Creatinine: 0.96  GFR: 92  Weight: 90.7  BMI: 36.6      # Type 2 diabetes mellitus with hyperglycemia  - lantus nightly:   - lispro with meals:   - please keep medium dose sliding scale with lispro with meals and nightly  - Patient's fingerstick glucose goal is 100-180 mg/dL.    - Patient can follow up at discharge with [facility] by calling [phone] to make an appointment.      Case discussed with Dr. Jennings. Primary team updated.       Amanda Piedra  Endocrinology Fellow    Service Pager: [phone]    SUBJECTIVE / INTERVAL HPI: Patient was seen and examined this morning.     Overnight events:  Pt is POD-2 s/p appendenctomy,     Endocrine course  7/26: appendectomy, lantus 16 + miss  7/27: lantus 18 + lispro 8 units + miss    CAPILLARY BLOOD GLUCOSE & INSULIN RECEIVED  222 mg/dL (07-27 @ 12:30) - 4 u lispro miss  254 mg/dL (07-27 @ 15:50)  278 mg/dL (07-27 @ 17:00) - lispro 6 miss  228 mg/dL (07-27 @ 22:00) - lispro 4 + lantus 18  157 @ 6:30am - 2 u lispro miss + lispro 8  172 @ 9am      REVIEW OF SYSTEMS  Constitutional:  Negative fever, chills or loss of appetite.  Eyes:  Negative blurry vision or double vision.  Cardiovascular:  Negative for chest pain or palpitations.  Respiratory:  Negative for cough, wheezing, or shortness of breath.    Gastrointestinal:  Negative for nausea, vomiting, diarrhea, constipation, or abdominal pain.  Genitourinary:  Negative frequency, urgency or dysuria.  Neurologic:  No headache, confusion, dizziness, lightheadedness.    PHYSICAL EXAM  Vital Signs Last 24 Hrs  T(C): 36.9 (28 Jul 2023 05:12), Max: 37.1 (27 Jul 2023 09:00)  T(F): 98.5 (28 Jul 2023 05:12), Max: 98.8 (27 Jul 2023 09:00)  HR: 91 (28 Jul 2023 05:12) (90 - 105)  BP: 116/79 (28 Jul 2023 05:12) (116/79 - 153/69)  BP(mean): --  RR: 17 (28 Jul 2023 05:12) (17 - 18)  SpO2: 95% (28 Jul 2023 05:12) (94% - 98%)    Parameters below as of 28 Jul 2023 05:12  Patient On (Oxygen Delivery Method): room air      Constitutional: Awake, alert, in no acute distress.   HEENT: Normocephalic, atraumatic, OSMAR, no proptosis or lid retraction.   Neck: supple, no acanthosis, no thyromegaly or palpable thyroid nodules.  Respiratory: Lungs clear to ausculation bilaterally.   Cardiovascular: regular rhythm, normal S1 and S2, no audible murmurs.   GI: normal bowel sounds, tenderness periumbilical region and RLQ  Extremities: No lower extremity edema, peripheral pulses present.   Skin: no rashes.   Psychiatric: AAO x 3. Normal affect/mood.     LABS  CBC - WBC/HGB/HTC/PLT: 12.18/14.0/41.1/242 (07-27-23)  BMP - Na/K/Cl/Bicarb/BUN/Cr/Gluc/AG/eGFR: 135/4.2/100/23/18/0.96/199/12/92 (07-27-23)  Ca - 8.0 (07-27-23)  Phos - 4.0 (07-27-23)  Mg - 1.9 (07-27-23)  LFT - Alb/Tprot/Tbili/Dbili/AlkPhos/ALT/AST: 3.8/--/0.4/--/51/23/14 (07-26-23)          07-26-23 @ 07:01  -  07-27-23 @ 07:00  --------------------------------------------------------  IN: 1280 mL / OUT: 925 mL / NET: 355 mL    07-27-23 @ 07:01  -  07-28-23 @ 06:06  --------------------------------------------------------  IN: 800 mL / OUT: 1200 mL / NET: -400 mL        MEDICATIONS  MEDICATIONS  (STANDING):  atorvastatin 20 milliGRAM(s) Oral at bedtime  budesonide  80 MICROgram(s)/formoterol 4.5 MICROgram(s) Inhaler 2 Puff(s) Inhalation two times a day  dextrose 5%. 1000 milliLiter(s) (100 mL/Hr) IV Continuous <Continuous>  dextrose 5%. 1000 milliLiter(s) (50 mL/Hr) IV Continuous <Continuous>  dextrose 50% Injectable 12.5 Gram(s) IV Push once  dextrose 50% Injectable 25 Gram(s) IV Push once  dextrose 50% Injectable 25 Gram(s) IV Push once  glucagon  Injectable 1 milliGRAM(s) IntraMuscular once  heparin   Injectable 7500 Unit(s) SubCutaneous every 8 hours  insulin glargine Injectable (LANTUS) 18 Unit(s) SubCutaneous at bedtime  insulin lispro (ADMELOG) corrective regimen sliding scale   SubCutaneous three times a day before meals  insulin lispro Injectable (ADMELOG) 8 Unit(s) SubCutaneous before dinner  insulin lispro Injectable (ADMELOG) 8 Unit(s) SubCutaneous before lunch  insulin lispro Injectable (ADMELOG) 8 Unit(s) SubCutaneous before breakfast  montelukast 10 milliGRAM(s) Oral daily  tiotropium 2.5 MICROgram(s) Inhaler 2 Puff(s) Inhalation daily    MEDICATIONS  (PRN):  dextrose Oral Gel 15 Gram(s) Oral once PRN Blood Glucose LESS THAN 70 milliGRAM(s)/deciliter  HYDROmorphone  Injectable 0.5 milliGRAM(s) IV Push every 15 minutes PRN Severe Pain (7 - 10)  oxyCODONE    IR 5 milliGRAM(s) Oral every 6 hours PRN Moderate Pain (4 - 6)  oxyCODONE    IR 10 milliGRAM(s) Oral every 6 hours PRN Severe Pain (7 - 10)    ASSESSMENT / RECOMMENDATIONS    58y Male with a past medical history of HTN, DM type 2 (on home med metformin 1000 bid and glimipiride 4 who have not seen a doctor for 1 year), COPD presented with 1 day history of sudden onset abdominal pain, found to have appendicitis, pending surgery later today.  Endocrinology has been consulted for Diabetes Managment.    A1C: 11.7 %  BUN: 18  Creatinine: 0.96  GFR: 92  Weight: 90.7  BMI: 36.6      # Type 2 diabetes mellitus with hyperglycemia  Inpatient recs  - lantus nightly: 18 units  - lispro with meals: 8 units   - please keep medium dose sliding scale with lispro with meals and nightly    Discharge recommendations  -Metformin 1000 bid + Glimipiride 2 + Jardiance 10    - Patient's fingerstick glucose goal is 100-180 mg/dL.    - Patient can follow up at discharge with [facility] by calling [phone] to make an appointment.      Case discussed with Dr. Jennings. Primary team updated.       Amanda Piedra  Endocrinology Fellow    Service Pager: [phone]

## 2023-07-28 NOTE — PROGRESS NOTE ADULT - TIME BILLING
Discharge regimen and dietary instruction
Insulin adjustments, regimen for discharge, review of diet

## 2023-07-28 NOTE — PROGRESS NOTE ADULT - SUBJECTIVE AND OBJECTIVE BOX
STATUS POST:  Laparoscopic appendectomy    POST OPERATIVE DAY #: 2    SUBJECTIVE: Patient was seen and examined by chief resident. Patient resting comfortably in bed with no acute complaints. Denies NV. +F/-BM.      Vital Signs Last 24 Hrs  T(C): 36.9 (28 Jul 2023 05:12), Max: 37.1 (27 Jul 2023 09:00)  T(F): 98.5 (28 Jul 2023 05:12), Max: 98.8 (27 Jul 2023 09:00)  HR: 91 (28 Jul 2023 05:12) (90 - 105)  BP: 116/79 (28 Jul 2023 05:12) (116/79 - 153/69)  BP(mean): --  RR: 17 (28 Jul 2023 05:12) (17 - 18)  SpO2: 95% (28 Jul 2023 05:12) (94% - 98%)    Parameters below as of 28 Jul 2023 05:12  Patient On (Oxygen Delivery Method): room air        I&O's Summary    27 Jul 2023 07:01  -  28 Jul 2023 07:00  --------------------------------------------------------  IN: 800 mL / OUT: 1200 mL / NET: -400 mL        Physical Exam:  General Appearance: Appears well, NAD  Pulmonary: Nonlabored breathing, no respiratory distress  Cardiovascular: NSR  Abdomen: Soft, nondistended, appropriate incisional tenderness, dressings clean and dry and intact  Extremities: WWP, SCD's in place     LABS:                        14.0   12.18 )-----------( 242      ( 27 Jul 2023 05:45 )             41.1     07-27    135  |  100  |  18  ----------------------------<  199<H>  4.2   |  23  |  0.96    Ca    8.0<L>      27 Jul 2023 05:45  Phos  4.0     07-27  Mg     1.9     07-27    TPro  6.4  /  Alb  3.8  /  TBili  0.4  /  DBili  x   /  AST  14  /  ALT  23  /  AlkPhos  51  07-26      Urinalysis Basic - ( 27 Jul 2023 05:45 )    Color: x / Appearance: x / SG: x / pH: x  Gluc: 199 mg/dL / Ketone: x  / Bili: x / Urobili: x   Blood: x / Protein: x / Nitrite: x   Leuk Esterase: x / RBC: x / WBC x   Sq Epi: x / Non Sq Epi: x / Bacteria: x

## 2023-07-28 NOTE — PROGRESS NOTE ADULT - ASSESSMENT
59 y/o M with PMH of HTN, DM II, HLD, COPD? presents with abdominal pain, admitted for possible appendicitis    Abdominal pain  Acute appendicitis  Post operative state   Continue management per surgery  IS, OOB  Pain management    HTN  Can resume home regimen on discharge  Follow-up with PCP    DM II  Will defer to Endocrinology    Skin rash  Localized to right knee, chronic. ? Psoriasis  Patient reports has scheduled appointment with Dermatology     COPD  Patient former smoker, denies any symptoms. Continue home regimen  Albuterol prn, IS  Monitor respiratory status    Liver steatosis   Avoid hepatotoxics, no reports of alcohol use  Follow-up as outpatient    Obesity BMI 36  Affects all aspect of care     DVT ppx: per primary team  Discussed with primary team            
58 year old male now s/p laparoscopic appendectomy on 7/26.     Reg (carb control)  Pain/nausea control  Home meds as appropriate  mISS, preprandial lispro, lantus  SCDs/SQH  IS/OOBA  AM labs
59 y/o M with PMH of HTN, DM II, HLD, COPD? presents with abdominal pain, admitted for possible appendicitis    Abdominal pain  Acute appendicitis  Post operative state   Continue management per surgery  IS, OOB  Pain management    HTN  Can resume home regimen on discharge  Follow-up with PCP    DM II  Will defer to Endocrinology    Skin rash  Localized to right knee, chronic. ? Psoriasis  Patient reports has scheduled appointment with Dermatology     COPD  Patient former smoker, denies any symptoms. Continue home regimen  Albuterol prn, IS  Monitor respiratory status    Liver steatosis   Avoid hepatotoxics, no reports of alcohol use  Follow-up as outpatient    Obesity BMI 36  Affects all aspect of care     DVT ppx: per primary team  Discussed with primary team      
58 year old male with pmhx of Dm2, htn, and hld w. no SHx presents to ED with new onset sharp consistent abdominal pain starting at the epigastrium and radiating to the RUQ. ED labs significant for wbc 13.8. CT w/ dilated fluid-filled appendix with mild inflammatory change admitted w/ acute appendicitis. Now s/p laparoscopic appendectomy on 7/26.     CLD  Pain/nausea control  Home meds as appropriate  mISS  SCDs/SQH/OOBA  AM labs

## 2023-07-28 NOTE — PROGRESS NOTE ADULT - REASON FOR ADMISSION
Abdominal Pain
Acute Appendicitis
Acute appendicitis

## 2023-08-01 DIAGNOSIS — E66.9 OBESITY, UNSPECIFIED: ICD-10-CM

## 2023-08-01 DIAGNOSIS — I10 ESSENTIAL (PRIMARY) HYPERTENSION: ICD-10-CM

## 2023-08-01 DIAGNOSIS — K76.0 FATTY (CHANGE OF) LIVER, NOT ELSEWHERE CLASSIFIED: ICD-10-CM

## 2023-08-01 DIAGNOSIS — Z87.891 PERSONAL HISTORY OF NICOTINE DEPENDENCE: ICD-10-CM

## 2023-08-01 DIAGNOSIS — L40.9 PSORIASIS, UNSPECIFIED: ICD-10-CM

## 2023-08-01 DIAGNOSIS — Z79.899 OTHER LONG TERM (CURRENT) DRUG THERAPY: ICD-10-CM

## 2023-08-01 DIAGNOSIS — R18.8 OTHER ASCITES: ICD-10-CM

## 2023-08-01 DIAGNOSIS — Z79.84 LONG TERM (CURRENT) USE OF ORAL HYPOGLYCEMIC DRUGS: ICD-10-CM

## 2023-08-01 DIAGNOSIS — E11.65 TYPE 2 DIABETES MELLITUS WITH HYPERGLYCEMIA: ICD-10-CM

## 2023-08-01 DIAGNOSIS — K35.890 OTHER ACUTE APPENDICITIS WITHOUT PERFORATION OR GANGRENE: ICD-10-CM

## 2023-08-01 DIAGNOSIS — K35.80 UNSPECIFIED ACUTE APPENDICITIS: ICD-10-CM

## 2023-08-01 DIAGNOSIS — E78.5 HYPERLIPIDEMIA, UNSPECIFIED: ICD-10-CM

## 2023-08-01 DIAGNOSIS — H40.9 UNSPECIFIED GLAUCOMA: ICD-10-CM

## 2023-08-01 DIAGNOSIS — J44.9 CHRONIC OBSTRUCTIVE PULMONARY DISEASE, UNSPECIFIED: ICD-10-CM

## 2023-08-01 PROBLEM — E11.9 TYPE 2 DIABETES MELLITUS WITHOUT COMPLICATIONS: Chronic | Status: ACTIVE | Noted: 2023-07-26

## 2023-08-09 PROBLEM — Z00.00 ENCOUNTER FOR PREVENTIVE HEALTH EXAMINATION: Status: ACTIVE | Noted: 2023-08-09

## 2023-08-09 LAB — SURGICAL PATHOLOGY STUDY: SIGNIFICANT CHANGE UP

## 2023-08-10 ENCOUNTER — APPOINTMENT (OUTPATIENT)
Dept: BARIATRICS | Facility: CLINIC | Age: 58
End: 2023-08-10
Payer: COMMERCIAL

## 2023-08-10 VITALS
TEMPERATURE: 97.3 F | OXYGEN SATURATION: 96 % | DIASTOLIC BLOOD PRESSURE: 81 MMHG | BODY MASS INDEX: 36.44 KG/M2 | HEIGHT: 62 IN | HEART RATE: 103 BPM | WEIGHT: 198 LBS | SYSTOLIC BLOOD PRESSURE: 115 MMHG

## 2023-08-10 DIAGNOSIS — Z78.9 OTHER SPECIFIED HEALTH STATUS: ICD-10-CM

## 2023-08-10 DIAGNOSIS — Z87.891 PERSONAL HISTORY OF NICOTINE DEPENDENCE: ICD-10-CM

## 2023-08-10 DIAGNOSIS — Z90.49 ACQUIRED ABSENCE OF OTHER SPECIFIED PARTS OF DIGESTIVE TRACT: ICD-10-CM

## 2023-08-10 DIAGNOSIS — E11.641 TYPE 2 DIABETES MELLITUS WITH HYPOGLYCEMIA WITH COMA: ICD-10-CM

## 2023-08-10 PROCEDURE — 99024 POSTOP FOLLOW-UP VISIT: CPT

## 2023-08-10 RX ORDER — METFORMIN HYDROCHLORIDE 625 MG/1
TABLET ORAL
Refills: 0 | Status: ACTIVE | COMMUNITY

## 2023-08-10 NOTE — HISTORY OF PRESENT ILLNESS
[de-identified] : Pt is a 57 y/o M 2 weeks s/p laparoscopic appendectomy, pmhx of DM2, who presents today feeling well here for post op care. Pt denies any fevers, chest pn, sob, calf pain, n,v,c,d since sx and states his abdominal pain has greatly improved. Pt states he has resumed his usual diet and home activities without any issues or concerns. Pt states he returned to work in which he is a  and understands he should avoid heavy lifting over 10 lbs x 6 weeks. Pt states he has a GI f/u next mo for colonoscopy which he will be following up with. Path reviewed, overall wnl. No other concerns at this time.

## 2023-08-10 NOTE — ADDENDUM
[FreeTextEntry1] : I, Dr. Be Zamora, spent 25 minutes with the patient >50% counseling/coordination of care including, reviewing the patient's history, performing an examination, reviewing relevant labs and radiographic imaging, reviewing PCP and consultant notes, discussion of medical and surgical management of the diagnosis as well as associated risks and benefits, and completing documentation.

## 2023-08-10 NOTE — PHYSICAL EXAM
[Obese] : obese [Normal] : affect appropriate [de-identified] : incisions healing well, dermabond intact. no evidence of bleeding, oozing, or infection

## 2023-08-10 NOTE — ASSESSMENT
[FreeTextEntry1] : Pt is a 59 y/o M 2 weeks s/p laparoscopic appendectomy, pmhx of DM2, who presents today feeling well here for post op care. Pt denies any fevers, chest pn, sob, calf pain, n,v,c,d since sx and states his abdominal pain has greatly improved. Pt states he has resumed his usual diet and home activities without any issues or concerns. Pt states he returned to work in which he is a  and understands he should avoid heavy lifting over 10 lbs x 6 weeks. Pt states he has a GI f/u next mo for colonoscopy which he will be following up with. Path reviewed, overall wnl. No other concerns at this time.

## 2024-05-15 ENCOUNTER — INPATIENT (INPATIENT)
Facility: HOSPITAL | Age: 59
LOS: 0 days | Discharge: HOME | End: 2024-05-16
Attending: STUDENT IN AN ORGANIZED HEALTH CARE EDUCATION/TRAINING PROGRAM | Admitting: STUDENT IN AN ORGANIZED HEALTH CARE EDUCATION/TRAINING PROGRAM
Payer: COMMERCIAL

## 2024-05-15 VITALS
WEIGHT: 199.96 LBS | HEART RATE: 133 BPM | SYSTOLIC BLOOD PRESSURE: 111 MMHG | RESPIRATION RATE: 18 BRPM | HEIGHT: 61 IN | OXYGEN SATURATION: 95 % | DIASTOLIC BLOOD PRESSURE: 75 MMHG | TEMPERATURE: 101 F

## 2024-05-15 LAB
ALBUMIN SERPL ELPH-MCNC: 3.7 G/DL — SIGNIFICANT CHANGE UP (ref 3.3–5)
ALP SERPL-CCNC: 46 U/L — SIGNIFICANT CHANGE UP (ref 40–120)
ALT FLD-CCNC: 43 U/L — SIGNIFICANT CHANGE UP (ref 10–45)
ANION GAP SERPL CALC-SCNC: 17 MMOL/L — SIGNIFICANT CHANGE UP (ref 5–17)
APPEARANCE UR: CLEAR — SIGNIFICANT CHANGE UP
APTT BLD: 34.5 SEC — SIGNIFICANT CHANGE UP (ref 24.5–35.6)
AST SERPL-CCNC: 49 U/L — HIGH (ref 10–40)
B-OH-BUTYR SERPL-SCNC: 0.7 MMOL/L — HIGH
BACTERIA # UR AUTO: ABNORMAL /HPF
BASE EXCESS BLDV CALC-SCNC: -2.9 MMOL/L — LOW (ref -2–3)
BASOPHILS # BLD AUTO: 0.01 K/UL — SIGNIFICANT CHANGE UP (ref 0–0.2)
BASOPHILS NFR BLD AUTO: 0.2 % — SIGNIFICANT CHANGE UP (ref 0–2)
BILIRUB SERPL-MCNC: 0.4 MG/DL — SIGNIFICANT CHANGE UP (ref 0.2–1.2)
BILIRUB UR-MCNC: NEGATIVE — SIGNIFICANT CHANGE UP
BUN SERPL-MCNC: 19 MG/DL — SIGNIFICANT CHANGE UP (ref 7–23)
CA-I SERPL-SCNC: 1.04 MMOL/L — LOW (ref 1.15–1.33)
CALCIUM SERPL-MCNC: 8.9 MG/DL — SIGNIFICANT CHANGE UP (ref 8.4–10.5)
CAST: 20 /LPF — HIGH (ref 0–4)
CHLORIDE SERPL-SCNC: 92 MMOL/L — LOW (ref 96–108)
CO2 BLDV-SCNC: 21.9 MMOL/L — LOW (ref 22–26)
CO2 SERPL-SCNC: 20 MMOL/L — LOW (ref 22–31)
COLOR SPEC: YELLOW — SIGNIFICANT CHANGE UP
CREAT SERPL-MCNC: 1.1 MG/DL — SIGNIFICANT CHANGE UP (ref 0.5–1.3)
DIFF PNL FLD: ABNORMAL
EGFR: 78 ML/MIN/1.73M2 — SIGNIFICANT CHANGE UP
EOSINOPHIL # BLD AUTO: 0 K/UL — SIGNIFICANT CHANGE UP (ref 0–0.5)
EOSINOPHIL NFR BLD AUTO: 0 % — SIGNIFICANT CHANGE UP (ref 0–6)
FINE GRAN CASTS #/AREA URNS AUTO: PRESENT
GAS PNL BLDV: 129 MMOL/L — LOW (ref 136–145)
GAS PNL BLDV: SIGNIFICANT CHANGE UP
GLUCOSE SERPL-MCNC: 270 MG/DL — HIGH (ref 70–99)
GLUCOSE UR QL: >=1000 MG/DL
HCO3 BLDV-SCNC: 21 MMOL/L — LOW (ref 22–29)
HCT VFR BLD CALC: 46.1 % — SIGNIFICANT CHANGE UP (ref 39–50)
HGB BLD-MCNC: 15.7 G/DL — SIGNIFICANT CHANGE UP (ref 13–17)
IMM GRANULOCYTES NFR BLD AUTO: 0.4 % — SIGNIFICANT CHANGE UP (ref 0–0.9)
INR BLD: 0.97 — SIGNIFICANT CHANGE UP (ref 0.85–1.18)
KETONES UR-MCNC: ABNORMAL MG/DL
LACTATE SERPL-SCNC: 1.7 MMOL/L — SIGNIFICANT CHANGE UP (ref 0.5–2)
LEUKOCYTE ESTERASE UR-ACNC: NEGATIVE — SIGNIFICANT CHANGE UP
LYMPHOCYTES # BLD AUTO: 1.51 K/UL — SIGNIFICANT CHANGE UP (ref 1–3.3)
LYMPHOCYTES # BLD AUTO: 26.7 % — SIGNIFICANT CHANGE UP (ref 13–44)
MCHC RBC-ENTMCNC: 28.1 PG — SIGNIFICANT CHANGE UP (ref 27–34)
MCHC RBC-ENTMCNC: 34.1 GM/DL — SIGNIFICANT CHANGE UP (ref 32–36)
MCV RBC AUTO: 82.5 FL — SIGNIFICANT CHANGE UP (ref 80–100)
MONOCYTES # BLD AUTO: 0.42 K/UL — SIGNIFICANT CHANGE UP (ref 0–0.9)
MONOCYTES NFR BLD AUTO: 7.4 % — SIGNIFICANT CHANGE UP (ref 2–14)
MUCOUS THREADS # UR AUTO: PRESENT
NEUTROPHILS # BLD AUTO: 3.7 K/UL — SIGNIFICANT CHANGE UP (ref 1.8–7.4)
NEUTROPHILS NFR BLD AUTO: 65.3 % — SIGNIFICANT CHANGE UP (ref 43–77)
NITRITE UR-MCNC: NEGATIVE — SIGNIFICANT CHANGE UP
NRBC # BLD: 0 /100 WBCS — SIGNIFICANT CHANGE UP (ref 0–0)
PCO2 BLDV: 33 MMHG — LOW (ref 42–55)
PH BLDV: 7.41 — SIGNIFICANT CHANGE UP (ref 7.32–7.43)
PH UR: 6 — SIGNIFICANT CHANGE UP (ref 5–8)
PLATELET # BLD AUTO: 212 K/UL — SIGNIFICANT CHANGE UP (ref 150–400)
PO2 BLDV: 63 MMHG — HIGH (ref 25–45)
POTASSIUM BLDV-SCNC: 4.2 MMOL/L — SIGNIFICANT CHANGE UP (ref 3.5–5.1)
POTASSIUM SERPL-MCNC: 4 MMOL/L — SIGNIFICANT CHANGE UP (ref 3.5–5.3)
POTASSIUM SERPL-SCNC: 4 MMOL/L — SIGNIFICANT CHANGE UP (ref 3.5–5.3)
PROT SERPL-MCNC: 6.9 G/DL — SIGNIFICANT CHANGE UP (ref 6–8.3)
PROT UR-MCNC: >=1000 MG/DL
PROTHROM AB SERPL-ACNC: 11.1 SEC — SIGNIFICANT CHANGE UP (ref 9.5–13)
RBC # BLD: 5.59 M/UL — SIGNIFICANT CHANGE UP (ref 4.2–5.8)
RBC # FLD: 12.3 % — SIGNIFICANT CHANGE UP (ref 10.3–14.5)
RBC CASTS # UR COMP ASSIST: 2 /HPF — SIGNIFICANT CHANGE UP (ref 0–4)
SAO2 % BLDV: 92.2 % — HIGH (ref 67–88)
SODIUM SERPL-SCNC: 129 MMOL/L — LOW (ref 135–145)
SP GR SPEC: >1.03 — HIGH (ref 1–1.03)
SQUAMOUS # UR AUTO: 2 /HPF — SIGNIFICANT CHANGE UP (ref 0–5)
UROBILINOGEN FLD QL: 1 MG/DL — SIGNIFICANT CHANGE UP (ref 0.2–1)
WBC # BLD: 5.66 K/UL — SIGNIFICANT CHANGE UP (ref 3.8–10.5)
WBC # FLD AUTO: 5.66 K/UL — SIGNIFICANT CHANGE UP (ref 3.8–10.5)
WBC UR QL: 4 /HPF — SIGNIFICANT CHANGE UP (ref 0–5)

## 2024-05-15 PROCEDURE — 71045 X-RAY EXAM CHEST 1 VIEW: CPT | Mod: 26

## 2024-05-15 PROCEDURE — 99285 EMERGENCY DEPT VISIT HI MDM: CPT

## 2024-05-15 RX ORDER — VANCOMYCIN HCL 1 G
1000 VIAL (EA) INTRAVENOUS ONCE
Refills: 0 | Status: COMPLETED | OUTPATIENT
Start: 2024-05-15 | End: 2024-05-15

## 2024-05-15 RX ORDER — PIPERACILLIN AND TAZOBACTAM 4; .5 G/20ML; G/20ML
3.38 INJECTION, POWDER, LYOPHILIZED, FOR SOLUTION INTRAVENOUS ONCE
Refills: 0 | Status: COMPLETED | OUTPATIENT
Start: 2024-05-15 | End: 2024-05-15

## 2024-05-15 RX ORDER — SODIUM CHLORIDE 9 MG/ML
2700 INJECTION INTRAMUSCULAR; INTRAVENOUS; SUBCUTANEOUS ONCE
Refills: 0 | Status: COMPLETED | OUTPATIENT
Start: 2024-05-15 | End: 2024-05-15

## 2024-05-15 RX ORDER — ACETAMINOPHEN 500 MG
650 TABLET ORAL ONCE
Refills: 0 | Status: COMPLETED | OUTPATIENT
Start: 2024-05-15 | End: 2024-05-15

## 2024-05-15 RX ADMIN — PIPERACILLIN AND TAZOBACTAM 200 GRAM(S): 4; .5 INJECTION, POWDER, LYOPHILIZED, FOR SOLUTION INTRAVENOUS at 20:58

## 2024-05-15 RX ADMIN — Medication 650 MILLIGRAM(S): at 20:58

## 2024-05-15 RX ADMIN — Medication 250 MILLIGRAM(S): at 22:44

## 2024-05-15 RX ADMIN — SODIUM CHLORIDE 2700 MILLILITER(S): 9 INJECTION INTRAMUSCULAR; INTRAVENOUS; SUBCUTANEOUS at 20:58

## 2024-05-15 NOTE — ED PROVIDER NOTE - CLINICAL SUMMARY MEDICAL DECISION MAKING FREE TEXT BOX
58M c PMH of NIDDM, HTN, PSH of appendectomy p/w 4 day h/o urinary frequency and not being able to get to the bathroom on time with some urinary incontinence and generalized malaise, started to have f/c yesterday. On exam, T38.3C, , VS otherwise wnl, no remarkable exam findings, abdomen non-tender.    ddx: sepsis 2/2 uti vs uri vs pna. no concern for cord compression given no back pain, urinary incontinence likely in setting of urinary frequency    Plan:  - ekg: st, no dom/std/twi  - labs: cbc wnl, cmp shows Na 129, glucose 270, AG wnl, no e/o dka based on cmp, will add vbg, lactate wnl  - ua does not show e/o uti, nitrite neg, normal wbc, le negative  - rvp pending  - cxr appears clear  - pt ordered for 30 cc/kg, vancomycin and zosyn and tylenol ordered   - d/w linus who accepts pt for admission for sepsis

## 2024-05-15 NOTE — ED ADULT NURSE NOTE - CHIEF COMPLAINT QUOTE
Pt presents to ED C/O fever at home with urinary frequency, foul smelling urine, fever, hyperglycemia and feeling generally unwell since Sunday. Hx DM.
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
## 2024-05-15 NOTE — ED PROVIDER NOTE - NS ED ROS FT
positive: f/c/urinary frequency/urinary incontinence  negative: congestion/cp/sob/abdominal pain/dysuria/hematuria/leg edema/rash/head trauma/loc/focal weakness/bowel incontinence

## 2024-05-15 NOTE — ED PROVIDER NOTE - PHYSICAL EXAMINATION
GENERAL:  Awake, alert and in NAD, non-toxic appearing  ENMT: Airway patent  EYES: conjunctiva clear  CARDIAC: tachycardic rate, regular rhythm.  Heart sounds S1, S2, no S3, S4. No murmurs, rubs or gallops.  RESPIRATORY: Breath sounds clear and equal in bilateral anterior lung fields, no wheezes/ronchi/crackles/stridor; pt breathing and speaking comfortably with no increased WOB, no accessory mm. use, no intercostal retractions, no nasal flaring  GI: abdomen soft, non-distended, non-tender, no rebound or guarding in ruq/luq/rlq/llq/epigastrium, no cvat bilat   SKIN: warm and dry, no rashes  PSYCH: awake, alert, calm and cooperative  EXTREMITIES: no ble edema  NEURO: gcs 15  spine: no midline c/tl spinal ttp or stepoffs

## 2024-05-16 ENCOUNTER — TRANSCRIPTION ENCOUNTER (OUTPATIENT)
Age: 59
End: 2024-05-16

## 2024-05-16 VITALS
OXYGEN SATURATION: 95 % | TEMPERATURE: 99 F | DIASTOLIC BLOOD PRESSURE: 72 MMHG | SYSTOLIC BLOOD PRESSURE: 117 MMHG | HEART RATE: 92 BPM | RESPIRATION RATE: 16 BRPM

## 2024-05-16 DIAGNOSIS — R35.0 FREQUENCY OF MICTURITION: ICD-10-CM

## 2024-05-16 DIAGNOSIS — R55 SYNCOPE AND COLLAPSE: ICD-10-CM

## 2024-05-16 DIAGNOSIS — J45.909 UNSPECIFIED ASTHMA, UNCOMPLICATED: ICD-10-CM

## 2024-05-16 DIAGNOSIS — I10 ESSENTIAL (PRIMARY) HYPERTENSION: ICD-10-CM

## 2024-05-16 DIAGNOSIS — Z29.9 ENCOUNTER FOR PROPHYLACTIC MEASURES, UNSPECIFIED: ICD-10-CM

## 2024-05-16 DIAGNOSIS — E11.9 TYPE 2 DIABETES MELLITUS WITHOUT COMPLICATIONS: ICD-10-CM

## 2024-05-16 DIAGNOSIS — J44.89 OTHER SPECIFIED CHRONIC OBSTRUCTIVE PULMONARY DISEASE: ICD-10-CM

## 2024-05-16 DIAGNOSIS — E87.1 HYPO-OSMOLALITY AND HYPONATREMIA: ICD-10-CM

## 2024-05-16 DIAGNOSIS — J10.1 INFLUENZA DUE TO OTHER IDENTIFIED INFLUENZA VIRUS WITH OTHER RESPIRATORY MANIFESTATIONS: ICD-10-CM

## 2024-05-16 LAB
A1C WITH ESTIMATED AVERAGE GLUCOSE RESULT: 11.4 % — HIGH (ref 4–5.6)
ALBUMIN SERPL ELPH-MCNC: 3.4 G/DL — SIGNIFICANT CHANGE UP (ref 3.3–5)
ALP SERPL-CCNC: 40 U/L — SIGNIFICANT CHANGE UP (ref 40–120)
ALT FLD-CCNC: 33 U/L — SIGNIFICANT CHANGE UP (ref 10–45)
ANION GAP SERPL CALC-SCNC: 12 MMOL/L — SIGNIFICANT CHANGE UP (ref 5–17)
AST SERPL-CCNC: 40 U/L — SIGNIFICANT CHANGE UP (ref 10–40)
BASOPHILS # BLD AUTO: 0 K/UL — SIGNIFICANT CHANGE UP (ref 0–0.2)
BASOPHILS NFR BLD AUTO: 0 % — SIGNIFICANT CHANGE UP (ref 0–2)
BILIRUB SERPL-MCNC: 0.4 MG/DL — SIGNIFICANT CHANGE UP (ref 0.2–1.2)
BUN SERPL-MCNC: 15 MG/DL — SIGNIFICANT CHANGE UP (ref 7–23)
CALCIUM SERPL-MCNC: 8.4 MG/DL — SIGNIFICANT CHANGE UP (ref 8.4–10.5)
CHLORIDE SERPL-SCNC: 99 MMOL/L — SIGNIFICANT CHANGE UP (ref 96–108)
CO2 SERPL-SCNC: 24 MMOL/L — SIGNIFICANT CHANGE UP (ref 22–31)
CREAT SERPL-MCNC: 0.87 MG/DL — SIGNIFICANT CHANGE UP (ref 0.5–1.3)
EGFR: 101 ML/MIN/1.73M2 — SIGNIFICANT CHANGE UP
EOSINOPHIL # BLD AUTO: 0 K/UL — SIGNIFICANT CHANGE UP (ref 0–0.5)
EOSINOPHIL NFR BLD AUTO: 0 % — SIGNIFICANT CHANGE UP (ref 0–6)
ESTIMATED AVERAGE GLUCOSE: 280 MG/DL — HIGH (ref 68–114)
FLUAV H1 2009 PAND RNA SPEC QL NAA+PROBE: DETECTED
GIANT PLATELETS BLD QL SMEAR: PRESENT — SIGNIFICANT CHANGE UP
GLUCOSE BLDC GLUCOMTR-MCNC: 202 MG/DL — HIGH (ref 70–99)
GLUCOSE BLDC GLUCOMTR-MCNC: 217 MG/DL — HIGH (ref 70–99)
GLUCOSE BLDC GLUCOMTR-MCNC: 259 MG/DL — HIGH (ref 70–99)
GLUCOSE SERPL-MCNC: 294 MG/DL — HIGH (ref 70–99)
HCT VFR BLD CALC: 41.3 % — SIGNIFICANT CHANGE UP (ref 39–50)
HGB BLD-MCNC: 13.8 G/DL — SIGNIFICANT CHANGE UP (ref 13–17)
LYMPHOCYTES # BLD AUTO: 0.57 K/UL — LOW (ref 1–3.3)
LYMPHOCYTES # BLD AUTO: 17 % — SIGNIFICANT CHANGE UP (ref 13–44)
MAGNESIUM SERPL-MCNC: 1.7 MG/DL — SIGNIFICANT CHANGE UP (ref 1.6–2.6)
MANUAL SMEAR VERIFICATION: SIGNIFICANT CHANGE UP
MCHC RBC-ENTMCNC: 28.1 PG — SIGNIFICANT CHANGE UP (ref 27–34)
MCHC RBC-ENTMCNC: 33.4 GM/DL — SIGNIFICANT CHANGE UP (ref 32–36)
MCV RBC AUTO: 84.1 FL — SIGNIFICANT CHANGE UP (ref 80–100)
MONOCYTES # BLD AUTO: 0.24 K/UL — SIGNIFICANT CHANGE UP (ref 0–0.9)
MONOCYTES NFR BLD AUTO: 7.1 % — SIGNIFICANT CHANGE UP (ref 2–14)
NEUTROPHILS # BLD AUTO: 2.57 K/UL — SIGNIFICANT CHANGE UP (ref 1.8–7.4)
NEUTROPHILS NFR BLD AUTO: 75 % — SIGNIFICANT CHANGE UP (ref 43–77)
NEUTS BAND # BLD: 0.9 % — SIGNIFICANT CHANGE UP (ref 0–8)
OSMOLALITY UR: 518 MOSM/KG — SIGNIFICANT CHANGE UP (ref 300–900)
OVALOCYTES BLD QL SMEAR: SLIGHT — SIGNIFICANT CHANGE UP
PHOSPHATE SERPL-MCNC: 3.2 MG/DL — SIGNIFICANT CHANGE UP (ref 2.5–4.5)
PLAT MORPH BLD: NORMAL — SIGNIFICANT CHANGE UP
PLATELET # BLD AUTO: 177 K/UL — SIGNIFICANT CHANGE UP (ref 150–400)
POIKILOCYTOSIS BLD QL AUTO: SLIGHT — SIGNIFICANT CHANGE UP
POLYCHROMASIA BLD QL SMEAR: SLIGHT — SIGNIFICANT CHANGE UP
POTASSIUM SERPL-MCNC: 3.5 MMOL/L — SIGNIFICANT CHANGE UP (ref 3.5–5.3)
POTASSIUM SERPL-SCNC: 3.5 MMOL/L — SIGNIFICANT CHANGE UP (ref 3.5–5.3)
PROT SERPL-MCNC: 6.3 G/DL — SIGNIFICANT CHANGE UP (ref 6–8.3)
RAPID RVP RESULT: DETECTED
RBC # BLD: 4.91 M/UL — SIGNIFICANT CHANGE UP (ref 4.2–5.8)
RBC # FLD: 12.4 % — SIGNIFICANT CHANGE UP (ref 10.3–14.5)
RBC BLD AUTO: ABNORMAL
RVP NOTIFY: SIGNIFICANT CHANGE UP
SARS-COV-2 RNA SPEC QL NAA+PROBE: SIGNIFICANT CHANGE UP
SMUDGE CELLS # BLD: PRESENT — SIGNIFICANT CHANGE UP
SODIUM SERPL-SCNC: 135 MMOL/L — SIGNIFICANT CHANGE UP (ref 135–145)
SODIUM UR-SCNC: 57 MMOL/L — SIGNIFICANT CHANGE UP
SPHEROCYTES BLD QL SMEAR: SLIGHT — SIGNIFICANT CHANGE UP
WBC # BLD: 3.38 K/UL — LOW (ref 3.8–10.5)
WBC # FLD AUTO: 3.38 K/UL — LOW (ref 3.8–10.5)

## 2024-05-16 PROCEDURE — 83036 HEMOGLOBIN GLYCOSYLATED A1C: CPT

## 2024-05-16 PROCEDURE — 82962 GLUCOSE BLOOD TEST: CPT

## 2024-05-16 PROCEDURE — 87040 BLOOD CULTURE FOR BACTERIA: CPT

## 2024-05-16 PROCEDURE — 0225U NFCT DS DNA&RNA 21 SARSCOV2: CPT

## 2024-05-16 PROCEDURE — 96374 THER/PROPH/DIAG INJ IV PUSH: CPT

## 2024-05-16 PROCEDURE — 99285 EMERGENCY DEPT VISIT HI MDM: CPT

## 2024-05-16 PROCEDURE — 36415 COLL VENOUS BLD VENIPUNCTURE: CPT

## 2024-05-16 PROCEDURE — 83935 ASSAY OF URINE OSMOLALITY: CPT

## 2024-05-16 PROCEDURE — 84300 ASSAY OF URINE SODIUM: CPT

## 2024-05-16 PROCEDURE — 99236 HOSP IP/OBS SAME DATE HI 85: CPT | Mod: GC

## 2024-05-16 PROCEDURE — 83735 ASSAY OF MAGNESIUM: CPT

## 2024-05-16 PROCEDURE — 71045 X-RAY EXAM CHEST 1 VIEW: CPT

## 2024-05-16 PROCEDURE — 81001 URINALYSIS AUTO W/SCOPE: CPT

## 2024-05-16 PROCEDURE — 82330 ASSAY OF CALCIUM: CPT

## 2024-05-16 PROCEDURE — 84100 ASSAY OF PHOSPHORUS: CPT

## 2024-05-16 PROCEDURE — 85025 COMPLETE CBC W/AUTO DIFF WBC: CPT

## 2024-05-16 PROCEDURE — 85730 THROMBOPLASTIN TIME PARTIAL: CPT

## 2024-05-16 PROCEDURE — 82010 KETONE BODYS QUAN: CPT

## 2024-05-16 PROCEDURE — 85610 PROTHROMBIN TIME: CPT

## 2024-05-16 PROCEDURE — 80053 COMPREHEN METABOLIC PANEL: CPT

## 2024-05-16 PROCEDURE — 82803 BLOOD GASES ANY COMBINATION: CPT

## 2024-05-16 PROCEDURE — 84295 ASSAY OF SERUM SODIUM: CPT

## 2024-05-16 PROCEDURE — 84132 ASSAY OF SERUM POTASSIUM: CPT

## 2024-05-16 PROCEDURE — 94640 AIRWAY INHALATION TREATMENT: CPT

## 2024-05-16 PROCEDURE — 83605 ASSAY OF LACTIC ACID: CPT

## 2024-05-16 PROCEDURE — 96375 TX/PRO/DX INJ NEW DRUG ADDON: CPT

## 2024-05-16 RX ORDER — MAGNESIUM SULFATE 500 MG/ML
1 VIAL (ML) INJECTION ONCE
Refills: 0 | Status: COMPLETED | OUTPATIENT
Start: 2024-05-16 | End: 2024-05-16

## 2024-05-16 RX ORDER — EMPAGLIFLOZIN 10 MG/1
1 TABLET, FILM COATED ORAL
Qty: 30 | Refills: 1
Start: 2024-05-16 | End: 2024-07-14

## 2024-05-16 RX ORDER — DEXTROSE 50 % IN WATER 50 %
25 SYRINGE (ML) INTRAVENOUS ONCE
Refills: 0 | Status: DISCONTINUED | OUTPATIENT
Start: 2024-05-16 | End: 2024-05-16

## 2024-05-16 RX ORDER — ACETAMINOPHEN 500 MG
650 TABLET ORAL EVERY 6 HOURS
Refills: 0 | Status: DISCONTINUED | OUTPATIENT
Start: 2024-05-16 | End: 2024-05-16

## 2024-05-16 RX ORDER — LOSARTAN POTASSIUM 100 MG/1
50 TABLET, FILM COATED ORAL DAILY
Refills: 0 | Status: DISCONTINUED | OUTPATIENT
Start: 2024-05-16 | End: 2024-05-16

## 2024-05-16 RX ORDER — BUDESONIDE AND FORMOTEROL FUMARATE DIHYDRATE 160; 4.5 UG/1; UG/1
2 AEROSOL RESPIRATORY (INHALATION)
Refills: 0 | Status: DISCONTINUED | OUTPATIENT
Start: 2024-05-16 | End: 2024-05-16

## 2024-05-16 RX ORDER — INSULIN GLARGINE 100 [IU]/ML
10 INJECTION, SOLUTION SUBCUTANEOUS AT BEDTIME
Refills: 0 | Status: DISCONTINUED | OUTPATIENT
Start: 2024-05-16 | End: 2024-05-16

## 2024-05-16 RX ORDER — DEXTROSE 50 % IN WATER 50 %
12.5 SYRINGE (ML) INTRAVENOUS ONCE
Refills: 0 | Status: DISCONTINUED | OUTPATIENT
Start: 2024-05-16 | End: 2024-05-16

## 2024-05-16 RX ORDER — INSULIN LISPRO 100/ML
VIAL (ML) SUBCUTANEOUS
Refills: 0 | Status: DISCONTINUED | OUTPATIENT
Start: 2024-05-16 | End: 2024-05-16

## 2024-05-16 RX ORDER — MONTELUKAST 4 MG/1
10 TABLET, CHEWABLE ORAL DAILY
Refills: 0 | Status: DISCONTINUED | OUTPATIENT
Start: 2024-05-16 | End: 2024-05-16

## 2024-05-16 RX ORDER — ONDANSETRON 8 MG/1
4 TABLET, FILM COATED ORAL EVERY 8 HOURS
Refills: 0 | Status: DISCONTINUED | OUTPATIENT
Start: 2024-05-16 | End: 2024-05-16

## 2024-05-16 RX ORDER — IPRATROPIUM/ALBUTEROL SULFATE 18-103MCG
3 AEROSOL WITH ADAPTER (GRAM) INHALATION EVERY 6 HOURS
Refills: 0 | Status: DISCONTINUED | OUTPATIENT
Start: 2024-05-16 | End: 2024-05-16

## 2024-05-16 RX ORDER — ATORVASTATIN CALCIUM 80 MG/1
20 TABLET, FILM COATED ORAL AT BEDTIME
Refills: 0 | Status: DISCONTINUED | OUTPATIENT
Start: 2024-05-16 | End: 2024-05-16

## 2024-05-16 RX ORDER — SODIUM CHLORIDE 9 MG/ML
1000 INJECTION, SOLUTION INTRAVENOUS
Refills: 0 | Status: DISCONTINUED | OUTPATIENT
Start: 2024-05-16 | End: 2024-05-16

## 2024-05-16 RX ORDER — INSULIN LISPRO 100/ML
5 VIAL (ML) SUBCUTANEOUS
Refills: 0 | Status: DISCONTINUED | OUTPATIENT
Start: 2024-05-16 | End: 2024-05-16

## 2024-05-16 RX ORDER — POTASSIUM CHLORIDE 20 MEQ
40 PACKET (EA) ORAL ONCE
Refills: 0 | Status: COMPLETED | OUTPATIENT
Start: 2024-05-16 | End: 2024-05-16

## 2024-05-16 RX ORDER — DEXTROSE 50 % IN WATER 50 %
15 SYRINGE (ML) INTRAVENOUS ONCE
Refills: 0 | Status: DISCONTINUED | OUTPATIENT
Start: 2024-05-16 | End: 2024-05-16

## 2024-05-16 RX ORDER — LANOLIN ALCOHOL/MO/W.PET/CERES
3 CREAM (GRAM) TOPICAL AT BEDTIME
Refills: 0 | Status: DISCONTINUED | OUTPATIENT
Start: 2024-05-16 | End: 2024-05-16

## 2024-05-16 RX ORDER — TIOTROPIUM BROMIDE 18 UG/1
2 CAPSULE ORAL; RESPIRATORY (INHALATION) DAILY
Refills: 0 | Status: DISCONTINUED | OUTPATIENT
Start: 2024-05-16 | End: 2024-05-16

## 2024-05-16 RX ORDER — ENOXAPARIN SODIUM 100 MG/ML
40 INJECTION SUBCUTANEOUS EVERY 12 HOURS
Refills: 0 | Status: DISCONTINUED | OUTPATIENT
Start: 2024-05-16 | End: 2024-05-16

## 2024-05-16 RX ORDER — GLUCAGON INJECTION, SOLUTION 0.5 MG/.1ML
1 INJECTION, SOLUTION SUBCUTANEOUS ONCE
Refills: 0 | Status: DISCONTINUED | OUTPATIENT
Start: 2024-05-16 | End: 2024-05-16

## 2024-05-16 RX ORDER — DEXTROSE 10 % IN WATER 10 %
125 INTRAVENOUS SOLUTION INTRAVENOUS ONCE
Refills: 0 | Status: DISCONTINUED | OUTPATIENT
Start: 2024-05-16 | End: 2024-05-16

## 2024-05-16 RX ADMIN — Medication 3 MILLILITER(S): at 04:05

## 2024-05-16 RX ADMIN — Medication 4: at 06:55

## 2024-05-16 RX ADMIN — Medication 75 MILLIGRAM(S): at 04:05

## 2024-05-16 RX ADMIN — LOSARTAN POTASSIUM 50 MILLIGRAM(S): 100 TABLET, FILM COATED ORAL at 05:47

## 2024-05-16 RX ADMIN — Medication 6: at 12:33

## 2024-05-16 RX ADMIN — ENOXAPARIN SODIUM 40 MILLIGRAM(S): 100 INJECTION SUBCUTANEOUS at 10:43

## 2024-05-16 RX ADMIN — SODIUM CHLORIDE 135 MILLILITER(S): 9 INJECTION, SOLUTION INTRAVENOUS at 12:28

## 2024-05-16 RX ADMIN — Medication 5 UNIT(S): at 12:33

## 2024-05-16 RX ADMIN — Medication 100 GRAM(S): at 12:28

## 2024-05-16 RX ADMIN — Medication 3 MILLILITER(S): at 10:44

## 2024-05-16 RX ADMIN — Medication 40 MILLIEQUIVALENT(S): at 12:28
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
## 2024-05-16 NOTE — H&P ADULT - NSHPLABSRESULTS_GEN_ALL_CORE
LABS:                         15.7   5.66  )-----------( 212      ( 15 May 2024 20:44 )             46.1     05-15    129<L>  |  92<L>  |  19  ----------------------------<  270<H>  4.0   |  20<L>  |  1.10    Ca    8.9      15 May 2024 20:44    TPro  6.9  /  Alb  3.7  /  TBili  0.4  /  DBili  x   /  AST  49<H>  /  ALT  43  /  AlkPhos  46  05-15    PT/INR - ( 15 May 2024 20:44 )   PT: 11.1 sec;   INR: 0.97          PTT - ( 15 May 2024 20:44 )  PTT:34.5 sec  Urinalysis Basic - ( 15 May 2024 20:57 )    Color: Yellow / Appearance: Clear / SG: >1.030 / pH: x  Gluc: x / Ketone: Trace mg/dL  / Bili: Negative / Urobili: 1.0 mg/dL   Blood: x / Protein: >=1000 mg/dL / Nitrite: Negative   Leuk Esterase: Negative / RBC: 2 /HPF / WBC 4 /HPF   Sq Epi: x / Non Sq Epi: 2 /HPF / Bacteria: Few /HPF            Lactate, Blood: 1.7 mmol/L (05-15 @ 20:44)      RADIOLOGY, EKG & ADDITIONAL TESTS:

## 2024-05-16 NOTE — H&P ADULT - PROBLEM SELECTOR PLAN 3
Home meds pioglitazone, metformin, glimepiride    Plan:  - mISS  - Consistent carb diet  - A1c  - Needs endocrine f/u outpatient Na 129 on admission, suspect hypovolemic hyponatremia.    Plan:  - F/u urine lytes  - Trend BMP Home meds: albuterol, spiriva. Former smoker.    Plan:  - C/w spiriva TI  - C/w noe Patient reports urinary frequency, urgency no dysuria. UA with no WBC elevation however noted glucosuria    Likely i/s/o glucosuria    Plan:   -moniot off abx Patient reports urinary frequency, urgency without dysuria or abnormal discharge. States he is no currently sexually active. UA with no WBC elevation however noted glucosuria  Likely i/s/o glucosuria    Plan:   -monitor off abx.  -encourage better glucose control on discharge

## 2024-05-16 NOTE — DISCHARGE NOTE PROVIDER - NSDCMRMEDTOKEN_GEN_ALL_CORE_FT
alcohol swabs: apply topically to the affected areas 4 times a day  atorvastatin 20 mg oral tablet: 1 orally once a day  glimepiride 2 mg oral tablet: 1 orally once a day  Glucometer (per patient&#x27;s insurance): test blood sugar 4 times a day.  dispense #1 glucometer.  Jardiance 10 mg oral tablet: 1 tab(s) orally once a day Please start taking medication Sunday 7/30  Lancets: 1 application subcutaneously 4 times a day  losartan-hydrochlorothiazide 50 mg-12.5 mg oral tablet: 1 orally once a day  metFORMIN 1000 mg oral tablet: 1 orally every 12 hours  montelukast 10 mg oral tablet: 1 orally once a day  pioglitazone 45 mg oral tablet: 1 orally once a day  test strips (per patient&#x27;s insurance): one application subcutaneously 4 times a day **Compatibile with patient&#x27;s glucometer**  Trelegy Ellipta 100 mcg-62.5 mcg-25 mcg/inh inhalation powder: 1 inhaled once a day   atorvastatin 20 mg oral tablet: 1 orally once a day  glimepiride 2 mg oral tablet: 1 orally once a day  Jardiance 10 mg oral tablet: 1 tab(s) orally once a day  losartan-hydrochlorothiazide 50 mg-12.5 mg oral tablet: 1 orally once a day  metFORMIN 1000 mg oral tablet: 1 orally every 12 hours  montelukast 10 mg oral tablet: 1 orally once a day  oseltamivir 75 mg oral capsule: 1 cap(s) orally 2 times a day  pioglitazone 45 mg oral tablet: 1 orally once a day  Trelegy Ellipta 100 mcg-62.5 mcg-25 mcg/inh inhalation powder: 1 inhaled once a day   atorvastatin 20 mg oral tablet: 1 orally once a day  glimepiride 2 mg oral tablet: 1 orally once a day  losartan-hydrochlorothiazide 50 mg-12.5 mg oral tablet: 1 orally once a day  metFORMIN 1000 mg oral tablet: 1 orally every 12 hours  montelukast 10 mg oral tablet: 1 orally once a day  oseltamivir 75 mg oral capsule: 1 cap(s) orally 2 times a day  pioglitazone 45 mg oral tablet: 1 orally once a day  Trelegy Ellipta 100 mcg-62.5 mcg-25 mcg/inh inhalation powder: 1 inhaled once a day

## 2024-05-16 NOTE — DISCHARGE NOTE PROVIDER - NSDCFUADDAPPT_GEN_ALL_CORE_FT
Dr. Arnaldo Ryoal M.D.  Address: [address] [place] [address]  Phone: [phone]  Please schedule an appointment within 1-2 weeks

## 2024-05-16 NOTE — DISCHARGE NOTE NURSING/CASE MANAGEMENT/SOCIAL WORK - NSTRANSFERBELONGINGSRESP_GEN_A_NUR
Discharge phone call completed with patient on 9/17/2018 @ 2005.  Patient denies questions or concerns for herself or the baby.     Patient is aware of signs and symptoms necessitating a call to her doctor or baby's doctor. Patient states she was able to get her prescriptions filled and does not have any questions about her prescriptions.      Patient has not scheduled any appointments yet; an EPIC message was sent to the Lead Nurse of the [facility] to have a PSR to call her to schedule her 1 week incision check and 6 week postpartum visit with patient verbalizing understanding. Patient states baby sees the doctor on 9/19/18.    Mae Stoddard RN   yes
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
## 2024-05-16 NOTE — H&P ADULT - PROBLEM SELECTOR PLAN 6
F: NI  E: Replete as needed  N: Diabetic diet  Dvt ppx: Lovenox BID  GI ppx: NI  Code status: FULL CODE Home meds losartan-HCTZ  - C/w home meds Home meds pioglitazone, metformin, glimepiride    Plan:  - mISS  - Consistent carb diet  - A1c  - Needs endocrine f/u outpatient Home meds pioglitazone, metformin, glimepiride    Plan:  - Lantus 19, Lispro 5 TID  - Consistent carb diet  - A1c  - Needs endocrine f/u outpatient
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
## 2024-05-16 NOTE — DISCHARGE NOTE NURSING/CASE MANAGEMENT/SOCIAL WORK - PATIENT PORTAL LINK FT
You can access the FollowMyHealth Patient Portal offered by [facility] by registering at the following website: [email or web address]. By joining [facility]’s FollowMyHealth portal, you will also be able to view your health information using other applications (apps) compatible with our system.

## 2024-05-16 NOTE — H&P ADULT - ASSESSMENT
This is a 58M with PMHx of NIDDM, HTN, asthma, appendectomy p/w 4 days of urinary frequency and urgency (not being able to get to the bathroom on time), generalized malaise, found to have Flu A.

## 2024-05-16 NOTE — DISCHARGE NOTE PROVIDER - NSDCCPCAREPLAN_GEN_ALL_CORE_FT
PRINCIPAL DISCHARGE DIAGNOSIS  Diagnosis: Influenza A  Assessment and Plan of Treatment: You were admitted to the hospital for the      SECONDARY DISCHARGE DIAGNOSES  Diagnosis: Sepsis  Assessment and Plan of Treatment:      PRINCIPAL DISCHARGE DIAGNOSIS  Diagnosis: Influenza A  Assessment and Plan of Treatment: You were admitted to the hospital for generalized weakness and was found to have Influenza virus also known as the flu. You      SECONDARY DISCHARGE DIAGNOSES  Diagnosis: Sepsis  Assessment and Plan of Treatment:      PRINCIPAL DISCHARGE DIAGNOSIS  Diagnosis: Influenza A  Assessment and Plan of Treatment: You were admitted to the hospital for generalized weakness and was found to have Influenza virus also known as the flu. You were treated with supportive care including tylenol, fluids and started on a medication called Tamiflu. On discharge from the hospital, please continue taking the Tamiflue as prescribed and follow up with your primary care doctor within 2 weeks.      SECONDARY DISCHARGE DIAGNOSES  Diagnosis: Syncope  Assessment and Plan of Treatment: You described an episode of loss of conscienceness that occurred a few days before. While this may be due to the flu and feeling generally weak, it is important to get further work up on this, when you see your primary care doctor. Blood tests, a heart monitor, an echocardiogram are some of the tests that could be performed to assess this further. If you experience another episode of loss of conscience, please seek care at the nearest emergency room as this could be a sign of life threatening condisitons including heart attack, or stroke.    Diagnosis: Type 2 diabetes mellitus  Assessment and Plan of Treatment: The blood tests that we performed showed that your blood sugar was very elevated. For this reason we recommend that you continue taking the diabetes medications as prescribed by your [facility] care doctor. In addition, given the significant elevation in your A1c level (which is a marker of blood sugar in the past 3 months) to a value of 11.4, it is important to follow up with an endocrinologist, a diabetes specialist to obtain further care.

## 2024-05-16 NOTE — H&P ADULT - PROBLEM SELECTOR PLAN 4
Home meds losartan-HCTZ  - C/w [facility] Home meds pioglitazone, metformin, glimepiride    Plan:  - mISS  - Consistent carb diet  - A1c  - Needs endocrine f/u outpatient Na 129 on admission, suspect hypovolemic hyponatremia.    Plan:  - F/u urine lytes  - Trend BMP Home meds: albuterol, spiriva. Former smoker.    Plan:  - C/w spiriva TI  - C/w noe

## 2024-05-16 NOTE — DISCHARGE NOTE NURSING/CASE MANAGEMENT/SOCIAL WORK - NSDCPEFALRISK_GEN_ALL_CORE
For information on Fall & Injury Prevention, visit: [email or web address] OR  [email or web address] OR  https://www.cdc.gov/steadi/patient.html

## 2024-05-16 NOTE — H&P ADULT - NSHPREVIEWOFSYSTEMS_GEN_ALL_CORE
---------------INCOMPLETE--------------------------    CONSTITUTIONAL: No fevers, chills, sweats, weakness, fatigue, or weight changes  HEENT: No visual or hearing changes;  No vertigo or throat pain   NECK: No pain or stiffness  RESPIRATORY: No cough, wheezing, hemoptysis; No shortness of breath  CARDIOVASCULAR: No chest pain or palpitations  GASTROINTESTINAL: No abdominal pain. No nausea, vomiting, diarrhea, or constipation. No melena.  GENITOURINARY: No dysuria, frequency or hematuria  NEUROLOGICAL: No numbness or weakness  SKIN: No itching, burning, rashes, or lesions   HEME: No bruising or bleeding  ENDO: No hypo-/hyper- thermia  All other review of systems as per HPI. CONSTITUTIONAL: No fevers, chills, sweats, weakness, fatigue, or weight changes  HEENT: No visual or hearing changes;  No vertigo or throat pain   NECK: No pain or stiffness  RESPIRATORY: No cough, wheezing, hemoptysis; No shortness of breath  CARDIOVASCULAR: No chest pain or palpitations  GASTROINTESTINAL: No abdominal pain. No nausea, vomiting, diarrhea, or constipation. No melena.  GENITOURINARY: No dysuria, frequency or hematuria  NEUROLOGICAL: No numbness or weakness  SKIN: No itching, burning, rashes, or lesions   HEME: No bruising or bleeding  ENDO: No hypo-/hyper- thermia  All other review of systems as per HPI.

## 2024-05-16 NOTE — H&P ADULT - PROBLEM SELECTOR PLAN 8
F: NI  E: Replete as needed  N: Diabetic diet  Dvt ppx: Lovenox BID  GI ppx: NI  Code status: FULL CODE

## 2024-05-16 NOTE — DISCHARGE NOTE NURSING/CASE MANAGEMENT/SOCIAL WORK - NSDCFUADDAPPT_GEN_ALL_CORE_FT
Dr. Arnaldo Royal M.D.  Address: [address] [place] [address]  Phone: [phone]  Please schedule an appointment within 1-2 weeks
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
## 2024-05-16 NOTE — H&P ADULT - PROBLEM SELECTOR PLAN 5
F: NI  E: Replete as needed  N: Diabetic diet  Dvt ppx: Lovenox BID  GI ppx: NI  Code status: FULL CODE Home meds losartan-HCTZ  - C/w home meds Home meds pioglitazone, metformin, glimepiride    Plan:  - mISS  - Consistent carb diet  - A1c  - Needs endocrine f/u outpatient Na 129 on admission, suspect hypovolemic hyponatremia.    Plan:  - F/u urine lytes  - Trend BMP Na 129 on admission. On repeat s/p 2.7 L Na improved to 135 likely i/s/o  hypovolemic hyponatremia    Plan:  -Encourage p.o intake and hydration.

## 2024-05-16 NOTE — PATIENT PROFILE ADULT - NSTRANSFERBELONGINGSDISPO_GEN_A_NUR
Implemented All Fall with Harm Risk Interventions:  [place] to call system. Call bell, personal items and telephone within reach. Instruct patient to call for assistance. Room bathroom lighting operational. Non-slip footwear when patient is off stretcher. Physically safe environment: no spills, clutter or unnecessary equipment. Stretcher in lowest position, wheels locked, appropriate side rails in place. Provide visual cue, wrist band, yellow gown, etc. Monitor gait and stability. Monitor for mental status changes and reorient to person, place, and time. Review medications for side effects contributing to fall risk. Reinforce activity limits and safety measures with patient and family. Provide visual clues: red socks.
with patient

## 2024-05-16 NOTE — H&P ADULT - PROBLEM SELECTOR PLAN 1
C/o generalized malaise, body aches, and urinary urgency. Found to be Flu A+    Plan:  - C/w tamiflu C/o generalized malaise, body aches, and urinary urgency. Found to be Flu A+. Mild expiratory wheezing bilaterally but overall good inspiratory effort, no c/f copd/asthma exacerbation at this time.    Plan:  - C/w tamiflu  - On RA; has yaaonebs Meets sepsis criteria for tachycardia and fever i/s/o influenza   C/o generalized malaise, body aches, and urinary urgency. Found to be Flu A+. Mild expiratory wheezing bilaterally but overall good inspiratory effort, no c/f copd/asthma exacerbation at this time.  s/p zosyn, vancomycin and 2.7 L of NS in the ED    Plan:  - C/w tamiflu 750 BID for 5 day course   - c/w incentive spirometry

## 2024-05-16 NOTE — H&P ADULT - PROBLEM SELECTOR PLAN 7
F: NI  E: Replete as needed  N: Diabetic diet  Dvt ppx: Lovenox BID  GI ppx: NI  Code status: FULL CODE Home meds losartan-HCTZ  - C/w home meds Home meds losartan-HCTZ.    Plan:    c/w Losartan, hold HCTZ given concern for dehydration

## 2024-05-21 LAB
CULTURE RESULTS: SIGNIFICANT CHANGE UP
CULTURE RESULTS: SIGNIFICANT CHANGE UP
SPECIMEN SOURCE: SIGNIFICANT CHANGE UP
SPECIMEN SOURCE: SIGNIFICANT CHANGE UP

## 2024-05-22 DIAGNOSIS — A41.9 SEPSIS, UNSPECIFIED ORGANISM: ICD-10-CM

## 2024-05-22 DIAGNOSIS — E86.1 HYPOVOLEMIA: ICD-10-CM

## 2024-05-22 DIAGNOSIS — I10 ESSENTIAL (PRIMARY) HYPERTENSION: ICD-10-CM

## 2024-05-22 DIAGNOSIS — J10.1 INFLUENZA DUE TO OTHER IDENTIFIED INFLUENZA VIRUS WITH OTHER RESPIRATORY MANIFESTATIONS: ICD-10-CM

## 2024-05-22 DIAGNOSIS — J44.9 CHRONIC OBSTRUCTIVE PULMONARY DISEASE, UNSPECIFIED: ICD-10-CM

## 2024-05-22 DIAGNOSIS — R35.0 FREQUENCY OF MICTURITION: ICD-10-CM

## 2024-05-22 DIAGNOSIS — Z87.891 PERSONAL HISTORY OF NICOTINE DEPENDENCE: ICD-10-CM

## 2024-05-22 DIAGNOSIS — J45.909 UNSPECIFIED ASTHMA, UNCOMPLICATED: ICD-10-CM

## 2024-05-22 DIAGNOSIS — E86.0 DEHYDRATION: ICD-10-CM

## 2024-05-22 DIAGNOSIS — Z79.84 LONG TERM (CURRENT) USE OF ORAL HYPOGLYCEMIC DRUGS: ICD-10-CM

## 2024-05-22 DIAGNOSIS — R55 SYNCOPE AND COLLAPSE: ICD-10-CM

## 2024-05-22 DIAGNOSIS — E11.65 TYPE 2 DIABETES MELLITUS WITH HYPERGLYCEMIA: ICD-10-CM

## 2024-05-22 DIAGNOSIS — E83.42 HYPOMAGNESEMIA: ICD-10-CM

## 2024-08-14 NOTE — ED PROVIDER NOTE - ADMIT DISPOSITION PRESENT ON ADMISSION SEPSIS Q1 - RE-EVALUATED PATIENT FLUID AND VITAL SIGNS
[Other reason not done] : Other reason not done [Never] : Never [de-identified] : aphasia I have re-evaluated the patient's fluid status and reviewed vital signs. Clinical perfusion assessment was performed.

## (undated) DEVICE — VENODYNE/SCD SLEEVE CALF MEDIUM

## (undated) DEVICE — WARMING BLANKET UPPER ADULT

## (undated) DEVICE — STAPLER COVIDIEN ENDO GIA STANDARD HANDLE

## (undated) DEVICE — INSUFFLATION NDL COVIDIEN SURGINEEDLE VERESS 120MM

## (undated) DEVICE — BLADE SURGICAL #15 CARBON

## (undated) DEVICE — TROCAR COVIDIEN VERSAONE BLUNT TIP HASSAN 12MM

## (undated) DEVICE — ENDOCATCH 10MM SPECIMEN POUCH

## (undated) DEVICE — PACK GENERAL LAPAROSCOPY

## (undated) DEVICE — DRSG DERMABOND 0.7ML

## (undated) DEVICE — SOL IRR BAG NS 0.9% 3000ML

## (undated) DEVICE — SUT VICRYL 0 27" UR-6

## (undated) DEVICE — LIGASURE MARYLAND 37CM

## (undated) DEVICE — TROCAR COVIDIEN VERSAPORT BLADELESS OPTICAL 5MM STANDARD

## (undated) DEVICE — POSITIONER FOAM EGG CRATE ULNAR 2PCS (PINK)

## (undated) DEVICE — DRAPE 1/2 SHEET 40X57"

## (undated) DEVICE — SUT MONOCRYL 4-0 18" PS-2

## (undated) DEVICE — ELCTR BOVIE PENCIL BLADE 10FT

## (undated) DEVICE — DRAPE 3/4 SHEET 52X76"

## (undated) DEVICE — TROCAR COVIDIEN VERSAPORT BLADELESS OPTICAL 12MM STANDARD

## (undated) DEVICE — TIP METZENBAUM SCISSOR MONOPOLAR ENDOCUT (ORANGE)

## (undated) DEVICE — TROCAR COVIDIEN VERSAONE FIXATION CANNULA 5MM

## (undated) DEVICE — Device

## (undated) DEVICE — TUBING STRYKER PNEUMOCLEAR HIGH FLOW

## (undated) DEVICE — GLV 7.5 PROTEXIS (WHITE)

## (undated) DEVICE — D HELP - CLEARVIEW CLEARIFY SYSTEM